# Patient Record
Sex: FEMALE | Race: WHITE | Employment: FULL TIME | URBAN - NONMETROPOLITAN AREA
[De-identification: names, ages, dates, MRNs, and addresses within clinical notes are randomized per-mention and may not be internally consistent; named-entity substitution may affect disease eponyms.]

---

## 2018-01-06 ENCOUNTER — HOSPITAL ENCOUNTER (EMERGENCY)
Age: 33
Discharge: HOME | End: 2018-01-06

## 2018-01-06 VITALS
DIASTOLIC BLOOD PRESSURE: 79 MMHG | WEIGHT: 116.6 LBS | SYSTOLIC BLOOD PRESSURE: 139 MMHG | HEART RATE: 129 BPM | BODY MASS INDEX: 21.33 KG/M2 | RESPIRATION RATE: 16 BRPM | OXYGEN SATURATION: 100 % | TEMPERATURE: 98.7 F

## 2018-01-06 DIAGNOSIS — J40 BRONCHITIS: Primary | ICD-10-CM

## 2018-01-06 PROCEDURE — 99212 OFFICE O/P EST SF 10 MIN: CPT

## 2018-01-06 PROCEDURE — 99213 OFFICE O/P EST LOW 20 MIN: CPT | Performed by: NURSE PRACTITIONER

## 2018-01-06 RX ORDER — PREDNISONE 20 MG/1
60 TABLET ORAL DAILY
Qty: 9 TABLET | Refills: 0 | Status: SHIPPED | OUTPATIENT
Start: 2018-01-06 | End: 2018-01-09

## 2018-01-06 NOTE — ED PROVIDER NOTES
Kenji Gomez [id number]  Urgent Care Encounter      CHIEF COMPLAINT       Chief Complaint   Patient presents with    Cough     productive    Generalized Body Aches    Chills       Nurses Notes reviewed and I agree except as noted in the HPI. HISTORY OF PRESENT ILLNESS   Selina Torres is a 29 y.o. female who presents The history is provided by the patient. URI   Presenting symptoms: congestion, cough, fatigue and sore throat    Cough:     Cough characteristics:  Non-productive, barking, hacking and harsh    Sputum characteristics:  Nondescript    Severity:  Moderate    Onset quality:  Gradual    Duration:  2 weeks    Timing:  Intermittent    Progression:  Waxing and waning    Chronicity:  Recurrent  Severity:  Moderate  Onset quality:  Gradual  Duration:  2 weeks  Timing:  Intermittent  Progression:  Partially resolved  Chronicity:  Recurrent  Relieved by:  None tried  Worsened by:  Drinking, eating and movement  Ineffective treatments:  Decongestant, hot fluids, OTC medications, rest and drinking  Associated symptoms: headaches, myalgias and wheezing    Associated symptoms: no arthralgias, no neck pain, no sinus pain, no sneezing and no swollen glands    Headaches:     Severity:  Moderate    Onset quality:  Gradual    Duration:  2 weeks    Timing:  Intermittent    Progression:  Waxing and waning    Chronicity:  New  Risk factors: recent illness and sick contacts    Risk factors comment:  Smoker    Pt is resistant to history taking by the provider and the staff, provided few details about her illness. Repeated many times that she works 84 hours a week, and she has been sick for 2 weeks. Reports she has been working even though she has been ill. Reports her children have been with their grandparents while she has been working 84 hours a week while she has been sick. Has cursed the staff for trying to get vital signs and provider while the assessment was ongoing.      REVIEW OF SYSTEMS     Review of and oriented to person, place, and time. Skin: Skin is warm and dry. No erythema. No pallor. Psychiatric: Thought content normal. Her mood appears anxious. Her affect is angry. Her speech is not rapid and/or pressured. She is agitated. Nursing note and vitals reviewed. DIAGNOSTIC RESULTS   Labs:No results found for this visit on 01/06/18. IMAGING:  No orders to display     URGENT CARE COURSE:     Vitals:    01/06/18 1804   BP: 139/79   Pulse: 129   Resp: 16   Temp: 98.7 °F (37.1 °C)   TempSrc: Oral   SpO2: 100%   Weight: 116 lb 9.6 oz (52.9 kg)       Medications - No data to display  PROCEDURES:  None  FINAL IMPRESSION      1. Bronchitis      Instructed pt on resolving upper respiratory infection, use of inhaler and steroid pack to decrease any residual inflammation in the chest, smoking cessation, need for increased fluids and rest to prevent dehydration. Attempted to instruct infection control measures in the home due to both children with influenza and strep. Pt is beligerent, cursing at the provider and the staff. Repeatedly asking for antibiotic for herself. Attempted instruction regarding the patient illness and patient is continually talking over the provider and the staff, continues to demand that she be treated for her illness. Explained the difference between bacterial and viral illness, and bronchitis versus pneumonia and pt again disputing the diagnosis. Pt reports she will go across the street to get antibiotics.      DISPOSITION/PLAN   DISPOSITION Decision To Discharge 01/06/2018 06:40:03 PM    PATIENT REFERRED TO:  [address] [facility]  [facility] [id number] [phone]  In 3 days  As needed, If symptoms worsen    DISCHARGE MEDICATIONS:  Discharge Medication List as of 1/6/2018  7:01 PM      START taking these medications    Details   predniSONE (DELTASONE) 20 MG tablet Take 3 tablets by mouth daily for 3 days, Disp-9 tablet, R-0Print albuterol-ipratropium (COMBIVENT RESPIMAT)  MCG/ACT AERS inhaler Inhale 1 puff into the lungs every 6 hours as needed for Wheezing, Disp-1 Inhaler, R-0Print           Discharge Medication List as of 1/6/2018  7:01 PM          FRANCO Zee CNP  01/07/18 0016

## 2018-01-07 ASSESSMENT — ENCOUNTER SYMPTOMS
SWOLLEN GLANDS: 0
COUGH: 1
DIARRHEA: 0
WHEEZING: 1
NAUSEA: 0
CHEST TIGHTNESS: 1
VOMITING: 0
EYE PAIN: 0
EYE ITCHING: 0
SINUS PAIN: 0
SORE THROAT: 1

## 2019-04-30 ENCOUNTER — HOSPITAL ENCOUNTER (EMERGENCY)
Age: 34
Discharge: HOME | End: 2019-05-01

## 2019-04-30 DIAGNOSIS — Q45.3 PANCREATIC DIVISUM: ICD-10-CM

## 2019-04-30 DIAGNOSIS — R19.8 BORBORYGMI: ICD-10-CM

## 2019-04-30 DIAGNOSIS — R10.84 GENERALIZED ABDOMINAL PAIN: Primary | ICD-10-CM

## 2019-04-30 PROCEDURE — 99284 EMERGENCY DEPT VISIT MOD MDM: CPT

## 2019-05-01 ENCOUNTER — APPOINTMENT (OUTPATIENT)
Dept: CT IMAGING | Age: 34
End: 2019-05-01

## 2019-05-01 VITALS
OXYGEN SATURATION: 100 % | SYSTOLIC BLOOD PRESSURE: 111 MMHG | HEART RATE: 79 BPM | BODY MASS INDEX: 22.86 KG/M2 | WEIGHT: 125 LBS | TEMPERATURE: 98 F | DIASTOLIC BLOOD PRESSURE: 74 MMHG | RESPIRATION RATE: 16 BRPM

## 2019-05-01 LAB
ALBUMIN SERPL-MCNC: 4.7 G/DL (ref 3.5–5.1)
ALP BLD-CCNC: 47 U/L (ref 38–126)
ALT SERPL-CCNC: 24 U/L (ref 11–66)
AMPHETAMINE+METHAMPHETAMINE URINE SCREEN: NEGATIVE
ANION GAP SERPL CALCULATED.3IONS-SCNC: 13 MEQ/L (ref 8–16)
AST SERPL-CCNC: 21 U/L (ref 5–40)
BARBITURATE QUANTITATIVE URINE: NEGATIVE
BASOPHILS # BLD: 0.6 %
BASOPHILS ABSOLUTE: 0 THOU/MM3 (ref 0–0.1)
BENZODIAZEPINE QUANTITATIVE URINE: NEGATIVE
BILIRUB SERPL-MCNC: 0.4 MG/DL (ref 0.3–1.2)
BILIRUBIN DIRECT: < 0.2 MG/DL (ref 0–0.3)
BILIRUBIN URINE: NEGATIVE
BLOOD, URINE: NEGATIVE
BUN BLDV-MCNC: 8 MG/DL (ref 7–22)
CALCIUM SERPL-MCNC: 9.3 MG/DL (ref 8.5–10.5)
CANNABINOID QUANTITATIVE URINE: POSITIVE
CHARACTER, URINE: CLEAR
CHLORIDE BLD-SCNC: 104 MEQ/L (ref 98–111)
CO2: 24 MEQ/L (ref 23–33)
COCAINE METABOLITE QUANTITATIVE URINE: NEGATIVE
COLOR: YELLOW
CREAT SERPL-MCNC: 0.5 MG/DL (ref 0.4–1.2)
EOSINOPHIL # BLD: 1.5 %
EOSINOPHILS ABSOLUTE: 0.1 THOU/MM3 (ref 0–0.4)
ERYTHROCYTE [DISTWIDTH] IN BLOOD BY AUTOMATED COUNT: 11.9 % (ref 11.5–14.5)
ERYTHROCYTE [DISTWIDTH] IN BLOOD BY AUTOMATED COUNT: 43.6 FL (ref 35–45)
GFR SERPL CREATININE-BSD FRML MDRD: > 90 ML/MIN/1.73M2
GLUCOSE BLD-MCNC: 93 MG/DL (ref 70–108)
GLUCOSE URINE: NEGATIVE MG/DL
HCT VFR BLD CALC: 40.5 % (ref 37–47)
HEMOGLOBIN: 13.5 GM/DL (ref 12–16)
IMMATURE GRANS (ABS): 0.01 THOU/MM3 (ref 0–0.07)
IMMATURE GRANULOCYTES: 0.1 %
KETONES, URINE: 40
LEUKOCYTE ESTERASE, URINE: NEGATIVE
LIPASE: 13.8 U/L (ref 5.6–51.3)
LYMPHOCYTES # BLD: 41 %
LYMPHOCYTES ABSOLUTE: 3 THOU/MM3 (ref 1–4.8)
MCH RBC QN AUTO: 33.6 PG (ref 26–33)
MCHC RBC AUTO-ENTMCNC: 33.3 GM/DL (ref 32.2–35.5)
MCV RBC AUTO: 100.7 FL (ref 81–99)
MONOCYTES # BLD: 5.5 %
MONOCYTES ABSOLUTE: 0.4 THOU/MM3 (ref 0.4–1.3)
NITRITE, URINE: NEGATIVE
NUCLEATED RED BLOOD CELLS: 0 /100 WBC
OPIATES, URINE: NEGATIVE
OSMOLALITY CALCULATION: 279.3 MOSMOL/KG (ref 275–300)
OXYCODONE: NEGATIVE
PH UA: 7 (ref 5–9)
PHENCYCLIDINE QUANTITATIVE URINE: NEGATIVE
PLATELET # BLD: 334 THOU/MM3 (ref 130–400)
PMV BLD AUTO: 9.6 FL (ref 9.4–12.4)
POTASSIUM SERPL-SCNC: 4.5 MEQ/L (ref 3.5–5.2)
PREGNANCY, SERUM: NEGATIVE
PROTEIN UA: NEGATIVE
RBC # BLD: 4.02 MILL/MM3 (ref 4.2–5.4)
SEG NEUTROPHILS: 51.3 %
SEGMENTED NEUTROPHILS ABSOLUTE COUNT: 3.7 THOU/MM3 (ref 1.8–7.7)
SODIUM BLD-SCNC: 141 MEQ/L (ref 135–145)
SPECIFIC GRAVITY, URINE: 1.01 (ref 1–1.03)
TOTAL PROTEIN: 7.4 G/DL (ref 6.1–8)
UROBILINOGEN, URINE: 0.2 EU/DL (ref 0–1)
WBC # BLD: 7.2 THOU/MM3 (ref 4.8–10.8)

## 2019-05-01 PROCEDURE — 6360000004 HC RX CONTRAST MEDICATION: Performed by: NURSE PRACTITIONER

## 2019-05-01 PROCEDURE — 83690 ASSAY OF LIPASE: CPT

## 2019-05-01 PROCEDURE — 36415 COLL VENOUS BLD VENIPUNCTURE: CPT

## 2019-05-01 PROCEDURE — 85025 COMPLETE CBC W/AUTO DIFF WBC: CPT

## 2019-05-01 PROCEDURE — 84703 CHORIONIC GONADOTROPIN ASSAY: CPT

## 2019-05-01 PROCEDURE — 80307 DRUG TEST PRSMV CHEM ANLYZR: CPT

## 2019-05-01 PROCEDURE — 74177 CT ABD & PELVIS W/CONTRAST: CPT

## 2019-05-01 PROCEDURE — 81003 URINALYSIS AUTO W/O SCOPE: CPT

## 2019-05-01 PROCEDURE — 82248 BILIRUBIN DIRECT: CPT

## 2019-05-01 PROCEDURE — 80053 COMPREHEN METABOLIC PANEL: CPT

## 2019-05-01 RX ORDER — DICYCLOMINE HYDROCHLORIDE 10 MG/1
10 CAPSULE ORAL EVERY 6 HOURS PRN
Qty: 20 CAPSULE | Refills: 0 | Status: SHIPPED | OUTPATIENT
Start: 2019-05-01 | End: 2019-07-28

## 2019-05-01 RX ADMIN — IOPAMIDOL 80 ML: 755 INJECTION, SOLUTION INTRAVENOUS at 02:23

## 2019-05-01 ASSESSMENT — PAIN DESCRIPTION - LOCATION: LOCATION: ABDOMEN

## 2019-05-01 ASSESSMENT — ENCOUNTER SYMPTOMS
VOMITING: 0
ABDOMINAL PAIN: 1
BLOOD IN STOOL: 0
NAUSEA: 0

## 2019-05-01 ASSESSMENT — PAIN SCALES - GENERAL: PAINLEVEL_OUTOF10: 3

## 2019-05-01 NOTE — ED PROVIDER NOTES
[address]  Pt Name: Andreina Owens  MRN: [id number]  [place] 1985  Date of evaluation: 2019  Provider: JACKLYN Moore CNP    CHIEF COMPLAINT       Chief Complaint   Patient presents with    Abdominal Pain       NursesNotes reviewed and I agree except as noted in the HPI. HISTORY OF PRESENT ILLNESS    Andreina Owens is a 30 y.o. female who presents to the emergency department from home for the evaluation of abdominal pain. The patient states that she has been experiencing abdominal pain starting 4 months ago. The patients states that her pain worsened to a 3/10 in severity today. She describes her pain as a straining pain. The patient is concerned that she is experiencing intraabdominal bleeding because she can taste blood in her mouth. The patient denies any hematemesis, or hematochezia. She states that she feels that something is moving around in her abdomen. The patient has a history of C-sections x4 and feels pain to the incision site. She also states that she has a protruding lump from the incision site. The patient reports that she lifts a lot at work. The patient admits lightheadedness and dizziness. The patient denies any nausea, vomiting, or any other signs or symptoms at this time. The patient denies taking any daily medications. The patient admits occasional alcohol consumption but denies any illicit drug use. Her last  was 4 years ago. She has been experiencing GERD recently. Triage notesand Nursing notes were reviewed by myself. Any discrepancies are addressed above. REVIEW OF SYSTEMS     Review of Systems   Gastrointestinal: Positive for abdominal pain. Negative for blood in stool, nausea and vomiting. Neurological: Positive for dizziness and light-headedness. All pertinent systems were reviewed and are negative unless indicated in the HPI.     PAST MEDICAL HISTORY    has a past medical history of Complication of anesthesia temperature is 98 °F (36.7 °C). Her blood pressure is 111/74 and her pulse is 79. Her respiration is 16 and oxygen saturation is 100%. Physical Exam   Constitutional: She is oriented to person, place, and time. She appears well-developed and well-nourished. Non-toxic appearance. HENT:   Head: Normocephalic and atraumatic. Right Ear: Tympanic membrane and external ear normal.   Left Ear: Tympanic membrane and external ear normal.   Nose: Nose normal.   Mouth/Throat: Oropharynx is clear and moist and mucous membranes are normal. No oropharyngeal exudate, posterior oropharyngeal edema or posterior oropharyngeal erythema. Eyes: Conjunctivae and EOM are normal.   Neck: Normal range of motion. Neck supple. No JVD present. Cardiovascular: Normal rate, regular rhythm, normal heart sounds, intact distal pulses and normal pulses. Exam reveals no gallop and no friction rub. No murmur heard. Pulmonary/Chest: Effort normal and breath sounds normal. No respiratory distress. She has no decreased breath sounds. She has no wheezes. She has no rhonchi. She has no rales. Abdominal: Soft. Bowel sounds are normal. She exhibits no distension. There is no tenderness. There is no rebound, no guarding and no CVA tenderness. The patient has a healing superficial abscess to the suprapubic area without erythema, induration, or warmth   Musculoskeletal: Normal range of motion. She exhibits no edema. Neurological: She is alert and oriented to person, place, and time. She exhibits normal muscle tone. Coordination normal.   Skin: Skin is warm and dry. No rash noted. She is not diaphoretic. Nursing note and vitals reviewed.     DIFFERENTIAL DIAGNOSIS:   Including but not limited to gastroenteritis, gastritis, UTI, pyelonephritis, hernia, gastroparesis    DIAGNOSTIC RESULTS     EKG: All EKG's are interpreted by the Emergency Department Physician who either signs or Co-signs this chart in theabsence of a ordered appropriate labs and a CT of the abdomen and pelvis. The patient tested positive for cannabis. The CT showed no acute inflammatory or infectious process in the abdomen or pelvis. No evidence of bowel obstruction. Findings consistent with pancreatic divisum. IUD in the uterus in satisfactory position. Laboratory and imaging results were reviewed and discussed with the patient. The patient responded well to treatment, and I noted her condition to remain stable. I decided that the patient could be discharged home with instructions to follow up with the [address] [place] [address] as written below. I wrote her a prescription for toradol which will be taken as directed. I explained my proposed course of discharge to the patient, and she verbalized understanding and agreement with my proposed plan. All her questions were addressed at bedside. The patient will return to the emergency department for any new or worsening symptoms. Strict return precautions and follow up instructions were discussed with the patient with which the patient agrees     Physical assessment findings, diagnostic testing(s) if applicable, and vital signs reviewed with patient/patient representative. Questions answered. Medications as directed, including OTC medications for supportivecare. Education provided on medications. Differential diagnosis(s) discussed with patient/patient representative. Home care/self care instructions reviewed with patient/patient representative. Patient is tofollow-up with family care provider in 2-3 days if no improvement. Patient is to go to the emergency department if symptoms worsen. Patient/patient representative is aware of care plan, questions answered, verbalizesunderstanding and is in agreement.      ED Medications administered this visit:    Medications   iopamidol (ISOVUE-370) 76 % injection 80 mL (80 mLs Intravenous Given 5/1/19 0223)           I have given the patient strict written and verbal instructions about care at home, follow-up, and signsand symptoms of worsening of condition and they did verbalize understanding. Patient was seen independently by myself. The Patient's finalimpression and disposition and plan was determined by myself. CRITICAL CARE:   None    CONSULTS:  None    PROCEDURES:  None    FINAL IMPRESSION      1. Generalized abdominal pain    2. Pancreatic divisum    3. Borborygmi          DISPOSITION/PLAN   DISPOSITION Decision To Discharge 05/01/2019 03:19:24 AM    PATIENT REFERRED TO:  DR. Manuel [address]  [facility] [place]    Schedule an appointment as soon as possible for a visit in 2 days  For follow up      DISCHARGE MEDICATIONS:  Discharge Medication List as of 5/1/2019  3:20 AM      START taking these medications    Details   dicyclomine (BENTYL) 10 MG capsule Take 1 capsule by mouth every 6 hours as needed (cramps), Disp-20 capsule, R-0Print             (Please note that portions of this note were completed witha voice recognition program.  Efforts were made to edit the dictations but occasionally words are mis-transcribed.)    JACKLYN Shearer CNP    Scribe:  Ronny Ko 5/1/19 12:41 AM Scribing for and in the presence of Elmo Vo CNP. Signed by: Kermit Toro,05/01/19 7:26 AM    Provider:  I personally performed the services described in the documentation, reviewed and edited the documentation which was dictated to the scribe in mypresence, and it accurately records my words and actions.     Elmo Vo CNP 5/1/19 7:26 AM        JACKLYN Shearer CNP, APRN - CNP  05/01/19 0637

## 2019-07-28 ENCOUNTER — HOSPITAL ENCOUNTER (INPATIENT)
Age: 34
LOS: 4 days | Discharge: HOME | DRG: 751 | End: 2019-08-01
Attending: FAMILY MEDICINE | Admitting: PSYCHIATRY & NEUROLOGY
Payer: MEDICAID

## 2019-07-28 DIAGNOSIS — F32.2 CURRENT SEVERE EPISODE OF MAJOR DEPRESSIVE DISORDER WITHOUT PSYCHOTIC FEATURES, UNSPECIFIED WHETHER RECURRENT (HCC): Primary | ICD-10-CM

## 2019-07-28 PROBLEM — F33.2 MAJOR DEPRESSIVE DISORDER, RECURRENT SEVERE WITHOUT PSYCHOTIC FEATURES (HCC): Status: ACTIVE | Noted: 2019-07-28

## 2019-07-28 LAB
ACETAMINOPHEN LEVEL: < 5 UG/ML (ref 0–20)
ALBUMIN SERPL-MCNC: 4.8 G/DL (ref 3.5–5.1)
ALP BLD-CCNC: 49 U/L (ref 38–126)
ALT SERPL-CCNC: 12 U/L (ref 11–66)
AMPHETAMINE+METHAMPHETAMINE URINE SCREEN: NEGATIVE
ANION GAP SERPL CALCULATED.3IONS-SCNC: 13 MEQ/L (ref 8–16)
AST SERPL-CCNC: 16 U/L (ref 5–40)
BARBITURATE QUANTITATIVE URINE: NEGATIVE
BASOPHILS # BLD: 0.6 %
BASOPHILS ABSOLUTE: 0.1 THOU/MM3 (ref 0–0.1)
BENZODIAZEPINE QUANTITATIVE URINE: NEGATIVE
BILIRUB SERPL-MCNC: 0.4 MG/DL (ref 0.3–1.2)
BILIRUBIN DIRECT: < 0.2 MG/DL (ref 0–0.3)
BILIRUBIN URINE: NEGATIVE
BLOOD, URINE: NEGATIVE
BUN BLDV-MCNC: 5 MG/DL (ref 7–22)
CALCIUM SERPL-MCNC: 9.3 MG/DL (ref 8.5–10.5)
CANNABINOID QUANTITATIVE URINE: POSITIVE
CHARACTER, URINE: CLEAR
CHLORIDE BLD-SCNC: 103 MEQ/L (ref 98–111)
CO2: 25 MEQ/L (ref 23–33)
COCAINE METABOLITE QUANTITATIVE URINE: NEGATIVE
COLOR: YELLOW
CREAT SERPL-MCNC: 0.7 MG/DL (ref 0.4–1.2)
EOSINOPHIL # BLD: 2.9 %
EOSINOPHILS ABSOLUTE: 0.3 THOU/MM3 (ref 0–0.4)
ERYTHROCYTE [DISTWIDTH] IN BLOOD BY AUTOMATED COUNT: 11.7 % (ref 11.5–14.5)
ERYTHROCYTE [DISTWIDTH] IN BLOOD BY AUTOMATED COUNT: 43.8 FL (ref 35–45)
ETHYL ALCOHOL, SERUM: 0.12 %
GFR SERPL CREATININE-BSD FRML MDRD: > 90 ML/MIN/1.73M2
GLUCOSE BLD-MCNC: 89 MG/DL (ref 70–108)
GLUCOSE URINE: NEGATIVE MG/DL
HCT VFR BLD CALC: 42.1 % (ref 37–47)
HEMOGLOBIN: 14.1 GM/DL (ref 12–16)
IMMATURE GRANS (ABS): 0.02 THOU/MM3 (ref 0–0.07)
IMMATURE GRANULOCYTES: 0.2 %
KETONES, URINE: NEGATIVE
LEUKOCYTE ESTERASE, URINE: NEGATIVE
LYMPHOCYTES # BLD: 47.5 %
LYMPHOCYTES ABSOLUTE: 4.2 THOU/MM3 (ref 1–4.8)
MCH RBC QN AUTO: 34.1 PG (ref 26–33)
MCHC RBC AUTO-ENTMCNC: 33.5 GM/DL (ref 32.2–35.5)
MCV RBC AUTO: 101.9 FL (ref 81–99)
MONOCYTES # BLD: 5.2 %
MONOCYTES ABSOLUTE: 0.5 THOU/MM3 (ref 0.4–1.3)
NITRITE, URINE: NEGATIVE
NUCLEATED RED BLOOD CELLS: 0 /100 WBC
OPIATES, URINE: NEGATIVE
OSMOLALITY CALCULATION: 278 MOSMOL/KG (ref 275–300)
OXYCODONE: NEGATIVE
PH UA: 6 (ref 5–9)
PHENCYCLIDINE QUANTITATIVE URINE: NEGATIVE
PLATELET # BLD: 307 THOU/MM3 (ref 130–400)
PMV BLD AUTO: 9.7 FL (ref 9.4–12.4)
POTASSIUM SERPL-SCNC: 3.9 MEQ/L (ref 3.5–5.2)
PREGNANCY, SERUM: NEGATIVE
PROTEIN UA: NEGATIVE
RBC # BLD: 4.13 MILL/MM3 (ref 4.2–5.4)
SALICYLATE, SERUM: < 0.3 MG/DL (ref 2–10)
SEG NEUTROPHILS: 43.6 %
SEGMENTED NEUTROPHILS ABSOLUTE COUNT: 3.9 THOU/MM3 (ref 1.8–7.7)
SODIUM BLD-SCNC: 141 MEQ/L (ref 135–145)
SPECIFIC GRAVITY, URINE: < 1.005 (ref 1–1.03)
TOTAL PROTEIN: 7.2 G/DL (ref 6.1–8)
TSH SERPL DL<=0.05 MIU/L-ACNC: 0.93 UIU/ML (ref 0.4–4.2)
UROBILINOGEN, URINE: 0.2 EU/DL (ref 0–1)
WBC # BLD: 8.9 THOU/MM3 (ref 4.8–10.8)

## 2019-07-28 PROCEDURE — 36415 COLL VENOUS BLD VENIPUNCTURE: CPT

## 2019-07-28 PROCEDURE — 84443 ASSAY THYROID STIM HORMONE: CPT

## 2019-07-28 PROCEDURE — 80048 BASIC METABOLIC PNL TOTAL CA: CPT

## 2019-07-28 PROCEDURE — 85025 COMPLETE CBC W/AUTO DIFF WBC: CPT

## 2019-07-28 PROCEDURE — 80307 DRUG TEST PRSMV CHEM ANLYZR: CPT

## 2019-07-28 PROCEDURE — G0480 DRUG TEST DEF 1-7 CLASSES: HCPCS

## 2019-07-28 PROCEDURE — 81003 URINALYSIS AUTO W/O SCOPE: CPT

## 2019-07-28 PROCEDURE — 80076 HEPATIC FUNCTION PANEL: CPT

## 2019-07-28 PROCEDURE — 99285 EMERGENCY DEPT VISIT HI MDM: CPT

## 2019-07-28 PROCEDURE — 1240000000 HC EMOTIONAL WELLNESS R&B

## 2019-07-28 PROCEDURE — 84703 CHORIONIC GONADOTROPIN ASSAY: CPT

## 2019-07-28 RX ORDER — NICOTINE 21 MG/24HR
1 PATCH, TRANSDERMAL 24 HOURS TRANSDERMAL DAILY
Status: DISCONTINUED | OUTPATIENT
Start: 2019-07-29 | End: 2019-08-01 | Stop reason: HOSPADM

## 2019-07-28 RX ORDER — ACETAMINOPHEN 325 MG/1
650 TABLET ORAL EVERY 4 HOURS PRN
Status: DISCONTINUED | OUTPATIENT
Start: 2019-07-28 | End: 2019-08-01 | Stop reason: HOSPADM

## 2019-07-28 RX ORDER — TRAZODONE HYDROCHLORIDE 50 MG/1
50 TABLET ORAL NIGHTLY PRN
Status: DISCONTINUED | OUTPATIENT
Start: 2019-07-29 | End: 2019-07-31

## 2019-07-28 RX ORDER — MAGNESIUM HYDROXIDE/ALUMINUM HYDROXICE/SIMETHICONE 120; 1200; 1200 MG/30ML; MG/30ML; MG/30ML
30 SUSPENSION ORAL EVERY 6 HOURS PRN
Status: DISCONTINUED | OUTPATIENT
Start: 2019-07-28 | End: 2019-08-01 | Stop reason: HOSPADM

## 2019-07-28 ASSESSMENT — PATIENT HEALTH QUESTIONNAIRE - PHQ9: SUM OF ALL RESPONSES TO PHQ QUESTIONS 1-9: 14

## 2019-07-28 ASSESSMENT — SLEEP AND FATIGUE QUESTIONNAIRES
AVERAGE NUMBER OF SLEEP HOURS: 7
DO YOU HAVE DIFFICULTY SLEEPING: NO
DO YOU USE A SLEEP AID: NO

## 2019-07-29 LAB — ETHYL ALCOHOL, SERUM: 0.05 %

## 2019-07-29 PROCEDURE — 6370000000 HC RX 637 (ALT 250 FOR IP): Performed by: FAMILY MEDICINE

## 2019-07-29 PROCEDURE — 36415 COLL VENOUS BLD VENIPUNCTURE: CPT

## 2019-07-29 PROCEDURE — 90792 PSYCH DIAG EVAL W/MED SRVCS: CPT | Performed by: PSYCHIATRY & NEUROLOGY

## 2019-07-29 PROCEDURE — G0480 DRUG TEST DEF 1-7 CLASSES: HCPCS

## 2019-07-29 PROCEDURE — 6370000000 HC RX 637 (ALT 250 FOR IP): Performed by: PSYCHIATRY & NEUROLOGY

## 2019-07-29 PROCEDURE — 1240000000 HC EMOTIONAL WELLNESS R&B

## 2019-07-29 RX ORDER — THIAMINE MONONITRATE (VIT B1) 100 MG
100 TABLET ORAL ONCE
Status: COMPLETED | OUTPATIENT
Start: 2019-07-29 | End: 2019-07-29

## 2019-07-29 RX ORDER — FOLIC ACID 1 MG/1
1 TABLET ORAL ONCE
Status: COMPLETED | OUTPATIENT
Start: 2019-07-29 | End: 2019-07-29

## 2019-07-29 RX ADMIN — FOLIC ACID 1 MG: 1 TABLET ORAL at 08:57

## 2019-07-29 RX ADMIN — Medication 100 MG: at 08:56

## 2019-07-29 RX ADMIN — TRAZODONE HYDROCHLORIDE 50 MG: 50 TABLET ORAL at 21:13

## 2019-07-29 ASSESSMENT — ENCOUNTER SYMPTOMS
NAUSEA: 0
COUGH: 0
SORE THROAT: 0
WHEEZING: 0
ABDOMINAL PAIN: 0
BACK PAIN: 0
DIARRHEA: 0
SHORTNESS OF BREATH: 0
VOMITING: 0
EYE PAIN: 0
RHINORRHEA: 0
EYE DISCHARGE: 0

## 2019-07-29 ASSESSMENT — LIFESTYLE VARIABLES: HISTORY_ALCOHOL_USE: YES

## 2019-07-29 ASSESSMENT — PATIENT HEALTH QUESTIONNAIRE - PHQ9: SUM OF ALL RESPONSES TO PHQ QUESTIONS 1-9: 13

## 2019-07-29 ASSESSMENT — SLEEP AND FATIGUE QUESTIONNAIRES
DO YOU USE A SLEEP AID: NO
AVERAGE NUMBER OF SLEEP HOURS: 7
DO YOU HAVE DIFFICULTY SLEEPING: NO

## 2019-07-29 ASSESSMENT — PAIN SCALES - GENERAL
PAINLEVEL_OUTOF10: 0

## 2019-07-29 NOTE — PROGRESS NOTES
establish firm housing. Patient admits to not being able to adequately provide for her children. Reports concern that she will not be able to get them to school or get them school supplies, uniforms, etc. HAROON discussed family shelters and also educated patient on respite placement with children services so patient can get on her feet and obtain stable employment and housing while children are in a safe place, however patient is determined to keep her children out of children services. Patient would like to be connected with family shelter, will provide patient with these resources. Patient reports occasional alcohol use and daily marijuana use. Legal issues denied at this time. HAROON will continue to discharge plan.      SHAWN Sullivan

## 2019-07-29 NOTE — PLAN OF CARE
Problem: Suicide risk  Goal: Provide patient with safe environment  Description  Provide patient with safe environment  7/29/2019 1632 by Estephanie Turcios LPN  Outcome: Ongoing  Note:   Patient provided with safe environment throughout the shift and during admission. 7/29/2019 1611 by Estephanie Turcios LPN  Outcome: Ongoing  7/29/2019 1453 by Estephanie Turcios LPN  Outcome: Ongoing  Note:   Patient denies suicidal ideations during shift assessment. Problem: Discharge Planning:  Goal: Discharged to appropriate level of care  Description  Discharged to appropriate level of care  7/29/2019 1632 by Estephanie Turcios LPN  Outcome: Ongoing  Note:   No discharge plans noted at this time during shift.  7/29/2019 1611 by Estephanie Turcios LPN  Outcome: Ongoing  7/29/2019 1453 by Estephanie Turcios LPN  Outcome: Ongoing  Note:   No discharge plans noted at this time during shift. Problem: Depressive Behavior With or Without Suicide Precautions:  Goal: Able to verbalize acceptance of life and situations over which he or she has no control  Description  Able to verbalize acceptance of life and situations over which he or she has no control  7/29/2019 1632 by Estephanie Turcios LPN  Outcome: Ongoing  Note:   Patient continues to work on verbalization of acceptance of life and situations over which she has no control. 7/29/2019 1611 by Estephanie Turcios LPN  Outcome: Ongoing  7/29/2019 1453 by Estephanie Turcios LPN  Outcome: Ongoing  Note:   Patient continues to work on verbalization of acceptance of life and situations over which she has no control. Goal: Able to verbalize and/or display a decrease in depressive symptoms  Description  Able to verbalize and/or display a decrease in depressive symptoms  7/29/2019 1632 by Estephanie Turcios LPN  Outcome: Ongoing  Note:   Patient reports depressive symptoms during shift assessment.  Patient rates depression 6/10.  7/29/2019 1611 by Estephanie Turcios planning  7/29/2019 1632 by Ahmet Mclean LPN  Outcome: Ongoing  Note:   Patient participates in care planning with staff.  7/29/2019 1611 by Ahmet Mclean LPN  Outcome: Ongoing  7/29/2019 1453 by Ahmet Mclean LPN  Outcome: Ongoing  Note:   Patient participates in care planning during shift with staff. Problem: Anxiety:  Goal: Level of anxiety will decrease  Description  Level of anxiety will decrease  7/29/2019 1632 by Ahmet Mclean LPN  Outcome: Ongoing  Note:   Patient denies anxiety during shift assessment. 7/29/2019 1611 by Ahmet Mclean LPN  Outcome: Ongoing  7/29/2019 1453 by Ahmet Mclean LPN  Outcome: Ongoing  Note:   Patient denies anxiety during shift assessment. Problem: KNOWLEDGE DEFICIT  Goal: Knowledge - personal safety  7/29/2019 1632 by Ahmet Mclean LPN  Outcome: Ongoing  Note:   Patient did not complete safety plan at this time during shift.  7/29/2019 1611 by Ahmet Mclean LPN  Outcome: Ongoing  7/29/2019 1453 by Ahmet Mclean LPN  Outcome: Ongoing  Note:   Patient did not complete safety plan at this time during shift. Care plan reviewed with patient.   Patient does verbalize understanding of the plan of care and does contribute to goal setting
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
## 2019-07-29 NOTE — H&P
Department of Psychiatry  Attending Physician Psychiatric Assessment     Reason for Admission to Psychiatric Unit:  Threat to self requiring 24 hour professional observation  Concerns about patient's safety in the community    CHIEF COMPLAINT:  Suicidal ideation with a plan to jump off of a bridge    History obtained from:  patient, electronic medical record and family members    HISTORY OF PRESENT ILLNESS:    Vanita Pitt is a 30 y.o. female with significant past medical history of depression who presented to the ED in by police after she was found climbing a bridge with an intent to jump off that and kill herself. Patient reports feeling down and low for more days than not. Endorses anhedonia. Patient notes her depression has been worsening for last few weeks . Patient identifies stressors as poor support, unable to provide a safe environment for her children, unemployed, housing issues. Patient reports poor sleep and appetite. Patient reports having trouble falling asleep. Patient reports poor concentration and energy. Patient endorses feelings of helplessness, hopelessness and worthlessness. She denies any psychotic symptoms. she denies any auditory or visual hallucinations.        PSYCHIATRIC HISTORY:  yes - depression  Currently follows with PCP  One lifetime suicide attempts  one psychiatric hospital admission - 10 years ago to [facility]    Past psychiatric medications includes: Some SSRI, could not recollect the name    Adverse reactions from psychotropic medications: yes - weight gain    Lifetime Psychiatric Review of Systems         Danae or Hypomania: denies      Panic Attacks: denies      Phobias: denies     Obsessions and Compulsions:denies     Body or Vocal Tics:  denies     Hallucinations:denies     Delusions: denies    Past Medical History:        Diagnosis Date    Complication of anesthesia     headache after last spinal    Postpartum depression     history of depression       Past Surgical

## 2019-07-29 NOTE — PATIENT CARE CONFERENCE
[facility]  Initial Interdisciplinary Treatment Plan NOTE    Review Date & Time: 7/29/19 1515    Patient was in treatment team    Admission Type:   Admission Type: Involuntary    Reason for admission:  Reason for Admission: Major depressive disorder      Estimated Length of Stay Update:  3-5 days  Estimated Discharge Date Update: 3-5 days    PATIENT STRENGTHS:  Patient Strengths Strengths: Communication, Motivated  Patient Strengths and Limitations:Limitations: Difficult relationships / poor social skills, Difficulty problem solving/relies on others to help solve problems, Multiple barriers to leisure interests  Addictive Behavior:Addictive Behavior  In the past 3 months, have you felt or has someone told you that you have a problem with:  : None  Do you have a history of Chemical Use?: No  Do you have a history of Alcohol Use?: Yes  Do you have a history of Street Drug Abuse?: Yes  Histroy of Prescripton Drug Abuse?: No  Medical Problems:  Past Medical History:   Diagnosis Date    Complication of anesthesia     headache after last spinal    Postpartum depression     history of depression       EDUCATION:   Learner Progress Toward Treatment Goals: Reviewed results and recommendations of this team, Reviewed group plan and strategies, Reviewed signs, symptoms and risk of self harm and violent behavior and Reviewed goals and plan of care    Method: Small group    Outcome: Verbalized understanding and Demonstrated Understanding    PATIENT GOALS: Improve depression, outpatient provider, housing    PLAN/TREATMENT RECOMMENDATIONS UPDATE:   1. What is the most important thing we can help you with while you are here?  - Improve depression, outpatient provider, housing  2. Who is your support system? - Limited  3. Do you have follow-up providers? - None  4. Do you have the ability to pay for your medications? - No  5. Where will you be residing when you leave the hospital?  - Unknown  6.  What is a phone

## 2019-07-30 PROCEDURE — 6370000000 HC RX 637 (ALT 250 FOR IP): Performed by: PSYCHIATRY & NEUROLOGY

## 2019-07-30 PROCEDURE — 99232 SBSQ HOSP IP/OBS MODERATE 35: CPT | Performed by: PSYCHIATRY & NEUROLOGY

## 2019-07-30 PROCEDURE — 90833 PSYTX W PT W E/M 30 MIN: CPT | Performed by: PSYCHIATRY & NEUROLOGY

## 2019-07-30 PROCEDURE — 1240000000 HC EMOTIONAL WELLNESS R&B

## 2019-07-30 RX ORDER — SERTRALINE HYDROCHLORIDE 100 MG/1
100 TABLET, FILM COATED ORAL DAILY
Status: DISCONTINUED | OUTPATIENT
Start: 2019-07-31 | End: 2019-08-01 | Stop reason: HOSPADM

## 2019-07-30 RX ADMIN — TRAZODONE HYDROCHLORIDE 50 MG: 50 TABLET ORAL at 21:16

## 2019-07-30 RX ADMIN — SERTRALINE 50 MG: 50 TABLET, FILM COATED ORAL at 10:33

## 2019-07-30 ASSESSMENT — PAIN SCALES - GENERAL
PAINLEVEL_OUTOF10: 0
PAINLEVEL_OUTOF10: 0

## 2019-07-30 NOTE — PROGRESS NOTES
Daily Progress Note  Stevenson Varela MD  7/30/2019  CHIEF COMPLAINT:  Suicidal ideation    Reviewed patient's current plan of care and vital signs with nursing staff. Sleep:  6 hours last night  Attending groups: Yes    SUBJECTIVE:    Patient laying in bed, withdrawn. She reports her mood is up and down. Patient is irritable off and on. Reports concentration is poor. Patient feels helpless ,hopless and worthless. . Denies any hallucinations or delusions. Sleep is fluctuating, appetite is poor. Patient feels down depressed nervous and anxious. has marked anticipatory anxiety, that was explored during the session, support was given and clarification done. Medication reviewed. No side effects from medication reported.  Side-effect of medication were discussed with the patient. Case was discussed with the  and with the staff. Mental Status Exam  Level of consciousness:  Within normal limits  Appearance: Hospital attire, seated in chair, with good grooming and hygiene   Behavior/Motor: No abnormalities noted  Attitude toward examiner:  Cooperative, attentive, good eye contact  Speech:  spontaneous, normal rate, normal volume and well articulated  Mood:  depressed  Affect: flat  Thought processes:  linear, goal directed and coherent  Thought content:  denies homicidal ideation  Suicidal Ideation: passive suicidal ideation  Delusions:  no evidence of delusions  Perceptual Disturbance:  denies any perceptual disturbance  Cognition:  Oriented to self, location, time, and situation  Memory: age appropriate  Insight & Judgement: improving  Medication side effects:  denies       Data   height is 5' 2\" (1.575 m) and weight is 135 lb (61.2 kg). Her tympanic temperature is 97.8 °F (36.6 °C). Her blood pressure is 94/50 (abnormal) and her pulse is 83. Her respiration is 16 and oxygen saturation is 100%.    Labs:   Admission on 07/28/2019   Component Date Value Ref Range Status    WBC 07/28/2019 8.9  4.8 - 40 U/L Final    ALT 07/28/2019 12  11 - 66 U/L Final    Total Protein 07/28/2019 7.2  6.1 - 8.0 g/dL Final    Performed at [address] [place] [address], [address]    TSH 07/28/2019 0.925  0.400 - 4.20 uIU/mL Final    Performed at [address] [place] [address], [facility]    Glucose, Ur 07/28/2019 NEGATIVE  NEGATIVE mg/dl Final    Bilirubin Urine 07/28/2019 NEGATIVE  NEGATIVE Final    Ketones, Urine 07/28/2019 NEGATIVE  NEGATIVE Final    Specific Gravity, Urine 07/28/2019 < 1.005  1.002 - 1.03 Final    Blood, Urine 07/28/2019 NEGATIVE  NEGATIVE Final    pH, UA 07/28/2019 6.0  5.0 - 9.0 Final    Protein, UA 07/28/2019 NEGATIVE  NEGATIVE Final    Urobilinogen, Urine 07/28/2019 0.2  0.0 - 1.0 eu/dl Final    Nitrite, Urine 07/28/2019 NEGATIVE  NEGATIVE Final    Leukocyte Esterase, Urine 07/28/2019 NEGATIVE  NEGATIVE Final    Color, UA 07/28/2019 YELLOW  STRAW-YELL Final    Character, Urine 07/28/2019 CLEAR  CLEAR-SL C Final    Performed at [address] [place] [address], [address]    AMPHETAMINE+METHAMPHETAMINE URINE * 07/28/2019 Negative  NEGATIVE Final    Barbiturate Quant, Ur 07/28/2019 Negative  NEGATIVE Final    Benzodiazepine Quant, Ur 07/28/2019 Negative  NEGATIVE Final    Cannabinoid Quant, Ur 07/28/2019 POSITIVE  NEGATIVE Final    Cocaine Metab Quant, Ur 07/28/2019 Negative  NEGATIVE Final    Opiates, Urine 07/28/2019 Negative  NEGATIVE Final    Oxycodone 07/28/2019 Negative  NEGATIVE Final    PCP Quant, Ur 07/28/2019 Negative  NEGATIVE Final    Comment: A \"Negative\" result for a drug abuse screen test indicates     that the drug concentration is below the following cutoffs:            Amphetamine/Methamphetamine     1000 ng/ml            Barbiturate                      200 ng/ml            Benzodiazapine                   200 ng/ml            Cannabinoids                      50 ng/ml            Cocaine Metabolite               300 ng/ml Foundation. Calculation based  upon serum creatinine and adjusted for age, gender & race. Светлана. Internal Med., Vol. 139 (2) pg 137-147. Performed at [address] [place] [address], [address], SERUM 07/29/2019 0.05  0.00 % Final    Performed at [address] [place] [address], [address]            Medications  Current Facility-Administered Medications: sertraline (ZOLOFT) tablet 50 mg, 50 mg, Oral, Daily  acetaminophen (TYLENOL) tablet 650 mg, 650 mg, Oral, Q4H PRN  traZODone (DESYREL) tablet 50 mg, 50 mg, Oral, Nightly PRN  magnesium hydroxide (MILK OF MAGNESIA) 400 MG/5ML suspension 30 mL, 30 mL, Oral, Daily PRN  aluminum & magnesium hydroxide-simethicone (MAALOX) 200-200-20 MG/5ML suspension 30 mL, 30 mL, Oral, Q6H PRN  nicotine (NICODERM CQ) 21 MG/24HR 1 patch, 1 patch, Transdermal, Daily    ASSESSMENT  Major depressive disorder, recurrent severe without psychotic features [facility])     PLAN  Patient s symptoms   are improving  Will increase Zoloft to help with mood  Attempt to develop insight  Psycho-education conducted. Supportive Therapy conducted. Probable discharge is TBD  Follow-up [place] services    More than 16 minutes of the session was spent doing supportive psychotherapy. Session started at 9:00am and ended at 9:30am.    Electronically signed by Naga Cunningham MD on 7/30/19 at 5:00 PM    **This report has been created using voice recognition software. It may contain minor errors which are inherent in voice recognition technology. **

## 2019-07-30 NOTE — GROUP NOTE
Group Therapy Note    Date: July 30    Group Start Time: 1050  Group End Time: 1125  Group Topic: Psychoeducation    [facility] 4E    SHAWN Samson            Notes:  Patient present in group. Patient active in group discussion regarding anxiety producing thoughts. Patient is able to discuss situations that cause significant anxiety for her. Able to develop rational counter thoughts to challenge these negative thinking patterns. Patient able to provide/recieve peer support appropriately. Status After Intervention:  Improved    Participation Level:  Active Listener and Interactive    Participation Quality: Appropriate, Attentive, Sharing and Supportive      Speech:  normal      Thought Process/Content: Logical      Affective Functioning: Congruent      Mood: euthymic      Level of consciousness:  Alert, Oriented x4 and Attentive      Response to Learning: Progressing to goal      Endings: None Reported    Modes of Intervention: Education, Support, Socialization, Exploration, Clarifying and Problem-solving      Discipline Responsible: /Counselor      Signature:  SHAWN Samson

## 2019-07-31 PROCEDURE — 99231 SBSQ HOSP IP/OBS SF/LOW 25: CPT | Performed by: PSYCHIATRY & NEUROLOGY

## 2019-07-31 PROCEDURE — 1240000000 HC EMOTIONAL WELLNESS R&B

## 2019-07-31 PROCEDURE — 6370000000 HC RX 637 (ALT 250 FOR IP): Performed by: PSYCHIATRY & NEUROLOGY

## 2019-07-31 PROCEDURE — 90833 PSYTX W PT W E/M 30 MIN: CPT | Performed by: PSYCHIATRY & NEUROLOGY

## 2019-07-31 RX ADMIN — SERTRALINE 100 MG: 100 TABLET, FILM COATED ORAL at 08:57

## 2019-07-31 ASSESSMENT — PAIN SCALES - GENERAL
PAINLEVEL_OUTOF10: 0
PAINLEVEL_OUTOF10: 0

## 2019-07-31 NOTE — PLAN OF CARE
Patient has attended all of the groups today and has also been out of her room for social interaction with others so she has met her socialization goal for this shift.

## 2019-07-31 NOTE — PLAN OF CARE
Problem: Suicide risk  Goal: Provide patient with safe environment  Description  Provide patient with safe environment  7/30/2019 2353 by Curt Contreras RN  Outcome: Completed  7/30/2019 1214 by Kelby Gillis RN  Outcome: Met This Shift  Note:   Maintained in safe and secure environment. Patient did not fill out safety plan this shift. Problem: Discharge Planning:  Goal: Discharged to appropriate level of care  Description  Discharged to appropriate level of care  7/30/2019 2353 by Curt Contreras RN  Outcome: Ongoing  Note:   Patient states she is currently checking for placement options for her and her children. 7/30/2019 1214 by Kelby Gillis RN  Outcome: Not Met This Shift  Note:   Discharge planners working with patient to achieve optimal discharge plan, specific to the needs of this patient. Problem: Depressive Behavior With or Without Suicide Precautions:  Goal: Able to verbalize acceptance of life and situations over which he or she has no control  Description  Able to verbalize acceptance of life and situations over which he or she has no control  7/30/2019 2353 by Curt Contreras RN  Outcome: Ongoing  Note:   Patient verbalizes fair acceptance of situations over which she has no control. 7/30/2019 1214 by Kelby Gillis RN  Outcome: Ongoing  Note:   Patient verbalizes some acceptance of situations over which she has no control. Encouraged patient to attend group therapy. Goal: Able to verbalize and/or display a decrease in depressive symptoms  Description  Able to verbalize and/or display a decrease in depressive symptoms  7/30/2019 2353 by Curt Contreras RN  Outcome: Ongoing  Note:   Patient states she continues to feel somewhat depressed this shift.   7/30/2019 1214 by Kelby Gillis RN  Outcome: Ongoing  Note:   Pt is still depressed and feels like everyone is judging her, pt started new medication and is hoping is helps with her depression  Goal: Ability to disclose and states she continues to feel somewhat anxious this shift. 7/30/2019 1214 by Cathy Kilpatrick RN  Outcome: Ongoing  Note:   Patient reports anxiety. Pt taking scheduled medication      Problem: KNOWLEDGE DEFICIT  Goal: Knowledge - personal safety  7/30/2019 2353 by Fanny Jackson RN  Outcome: Ongoing  Note:   Patient maintained in a safe environment. 15 minute visual checks continued. Patient verbalizes fair understanding of safety precautions. 7/30/2019 1214 by Cathy Kilpatrick RN  Outcome: Ongoing  Note:   Maintained in safe and secure environment. Patient did not fill out safety plan this shift. Problem: Social interaction  Goal: Increased social interaction  7/30/2019 1136 by Anjelica Poor  Outcome: Met This Shift   Care plan reviewed with patient.   Patient does verbalize understanding of the plan of care and does contribute to goal setting

## 2019-07-31 NOTE — PROGRESS NOTES
07/28/2019 14.1  12.0 - 16.0 gm/dl Final    Hematocrit 07/28/2019 42.1  37.0 - 47.0 % Final    MCV 07/28/2019 101.9* 81.0 - 99.0 fL Final    MCH 07/28/2019 34.1* 26.0 - 33.0 pg Final    MCHC 07/28/2019 33.5  32.2 - 35.5 gm/dl Final    RDW-CV 07/28/2019 11.7  11.5 - 14.5 % Final    RDW-SD 07/28/2019 43.8  35.0 - 45.0 fL Final    Platelets 29/92/9554 307  130 - 400 thou/mm3 Final    MPV 07/28/2019 9.7  9.4 - 12.4 fL Final    Seg Neutrophils 07/28/2019 43.6  % Final    Lymphocytes 07/28/2019 47.5  % Final    Monocytes 07/28/2019 5.2  % Final    Eosinophils 07/28/2019 2.9  % Final    Basophils 07/28/2019 0.6  % Final    Immature Granulocytes 07/28/2019 0.2  % Final    Segs Absolute 07/28/2019 3.9  1.8 - 7.7 thou/mm3 Final    Lymphocytes # 07/28/2019 4.2  1.0 - 4.8 thou/mm3 Final    Monocytes # 07/28/2019 0.5  0.4 - 1.3 thou/mm3 Final    Eosinophils # 07/28/2019 0.3  0.0 - 0.4 thou/mm3 Final    Basophils # 07/28/2019 0.1  0.0 - 0.1 thou/mm3 Final    Immature Grans (Abs) 07/28/2019 0.02  0.00 - 0.07 thou/mm3 Final    nRBC 07/28/2019 0  /100 wbc Final    Performed at [facility], [address]    Sodium 07/28/2019 141  135 - 145 meq/L Final    Potassium 07/28/2019 3.9  3.5 - 5.2 meq/L Final    Chloride 07/28/2019 103  98 - 111 meq/L Final    CO2 07/28/2019 25  23 - 33 meq/L Final    Glucose 07/28/2019 89  70 - 108 mg/dL Final    BUN 07/28/2019 5* 7 - 22 mg/dL Final    CREATININE 07/28/2019 0.7  0.4 - 1.2 mg/dL Final    Calcium 07/28/2019 9.3  8.5 - 10.5 mg/dL Final    Performed at [address] [place] [address], [address]    Alb 07/28/2019 4.8  3.5 - 5.1 g/dL Final    Total Bilirubin 07/28/2019 0.4  0.3 - 1.2 mg/dL Final    Bilirubin, Direct 07/28/2019 <0.2  0.0 - 0.3 mg/dL Final    Alkaline Phosphatase 07/28/2019 49  38 - 126 U/L Final    AST 07/28/2019 16  5 - 40 U/L Final    ALT 07/28/2019 12  11 - 66 U/L Final    Total Protein 07/28/2019 7.2

## 2019-07-31 NOTE — PLAN OF CARE
anxiety will decrease  Outcome: Ongoing  Note:   Patient denies anxiety during shift assessment. Problem: KNOWLEDGE DEFICIT  Goal: Knowledge - personal safety  Outcome: Ongoing  Note:   Patient did not complete safety plan at this time during shift. Problem: Social interaction  Goal: Increased social interaction  7/31/2019 1355 by Zoe Olivares  Outcome: Met This Shift     Care plan reviewed with patient.   Patient does verbalize understanding of the plan of care and does contribute to goal setting

## 2019-08-01 VITALS
HEART RATE: 85 BPM | OXYGEN SATURATION: 97 % | HEIGHT: 62 IN | WEIGHT: 135 LBS | RESPIRATION RATE: 16 BRPM | DIASTOLIC BLOOD PRESSURE: 67 MMHG | TEMPERATURE: 96.9 F | SYSTOLIC BLOOD PRESSURE: 111 MMHG | BODY MASS INDEX: 24.84 KG/M2

## 2019-08-01 PROCEDURE — 6370000000 HC RX 637 (ALT 250 FOR IP): Performed by: PSYCHIATRY & NEUROLOGY

## 2019-08-01 PROCEDURE — 99239 HOSP IP/OBS DSCHRG MGMT >30: CPT | Performed by: PSYCHIATRY & NEUROLOGY

## 2019-08-01 PROCEDURE — 5130000000 HC BRIDGE APPOINTMENT

## 2019-08-01 RX ORDER — SERTRALINE HYDROCHLORIDE 100 MG/1
100 TABLET, FILM COATED ORAL DAILY
Qty: 30 TABLET | Refills: 0 | Status: SHIPPED | OUTPATIENT
Start: 2019-08-02 | End: 2020-06-16

## 2019-08-01 RX ADMIN — SERTRALINE 100 MG: 100 TABLET, FILM COATED ORAL at 08:26

## 2019-08-01 NOTE — BH NOTE
24 hour chart review completed.
24 hour chart review completed.
Group Therapy Note    Date: 8/1/2019  Start Time:  1000  End Time:   7288  Number of Participants:  7    Type of Group: Recreational/Coping Skills    Patient's Goal:    Increase knowledge of positive coping skills and socialization. Notes:    Patient was social with others. Patient was able to identify multiple positive coping skills.      Status After Intervention:  Improved    Participation Level: Interactive    Participation Quality: Appropriate, Attentive and Sharing      Speech:  normal      Thought Process/Content: Logical      Affective Functioning: Congruent      Mood: euthymic      Level of consciousness:  Oriented x4      Response to Learning: Able to verbalize current knowledge/experience, Capable of insight and Progressing to goal      Endings: None Reported    Modes of Intervention: Education, Support, Socialization, Exploration and Activity      Discipline Responsible: Psychoeducational Specialist      Signature:  Adina Dumont
PLAN OF CARE:     Start Time: 0900  End Time:  0930    Group Topic:  Daily Goals    Group Type:   Goal Group    Intervention/Goal:  To increase support and identify daily goals    Attendance:   Attended group     Affect:    flat    Behavior:   Cooperative and pleasant    Response:   appropriate    Daily Goal:    Get out of here, get resources for my family and be with my daughter today.      Progress:   Progressing to goal
Patient declined to attend evening group.
This R.N. has reviewed and agrees with Rosio Gentile LPN's shift   Assessment.     Lisandro Dwyer  7/31/2019
blunt  APPEARANCE:  appropriate    HEARING:   No problems  VISION:   No problems   VERBAL COMMUNICATION:   No problems  WRITTEN COMMUNICATION:   No problems    COORDINATION:  No problems noted   MOBILITY:   Ambulates independently    GOALS:   Increase socialization by attending groups on the unit daily.

## 2019-08-02 ENCOUNTER — TELEPHONE (OUTPATIENT)
Dept: PSYCHIATRY | Age: 34
End: 2019-08-02

## 2020-06-16 ENCOUNTER — APPOINTMENT (OUTPATIENT)
Dept: GENERAL RADIOLOGY | Age: 35
End: 2020-06-16
Payer: MEDICARE

## 2020-06-16 ENCOUNTER — HOSPITAL ENCOUNTER (EMERGENCY)
Age: 35
Discharge: HOME | End: 2020-06-16
Payer: MEDICARE

## 2020-06-16 VITALS
BODY MASS INDEX: 27.6 KG/M2 | HEART RATE: 92 BPM | SYSTOLIC BLOOD PRESSURE: 138 MMHG | TEMPERATURE: 98.3 F | RESPIRATION RATE: 14 BRPM | WEIGHT: 150 LBS | OXYGEN SATURATION: 100 % | HEIGHT: 62 IN | DIASTOLIC BLOOD PRESSURE: 81 MMHG

## 2020-06-16 PROCEDURE — 99283 EMERGENCY DEPT VISIT LOW MDM: CPT

## 2020-06-16 PROCEDURE — 72100 X-RAY EXAM L-S SPINE 2/3 VWS: CPT

## 2020-06-16 RX ORDER — ACETAMINOPHEN 500 MG
1000 TABLET ORAL EVERY 6 HOURS PRN
COMMUNITY

## 2020-06-16 RX ORDER — CYCLOBENZAPRINE HCL 10 MG
10 TABLET ORAL 3 TIMES DAILY PRN
Qty: 20 TABLET | Refills: 0 | Status: SHIPPED | OUTPATIENT
Start: 2020-06-16

## 2020-06-16 ASSESSMENT — ENCOUNTER SYMPTOMS
ABDOMINAL PAIN: 0
VOMITING: 0
CONSTIPATION: 0
NAUSEA: 0
SHORTNESS OF BREATH: 0
BACK PAIN: 1
DIARRHEA: 0

## 2020-06-16 ASSESSMENT — PAIN DESCRIPTION - DESCRIPTORS: DESCRIPTORS: SHARP

## 2020-06-16 ASSESSMENT — PAIN DESCRIPTION - ORIENTATION: ORIENTATION: LOWER

## 2020-06-16 ASSESSMENT — PAIN DESCRIPTION - PAIN TYPE: TYPE: ACUTE PAIN

## 2020-06-16 ASSESSMENT — PAIN DESCRIPTION - LOCATION: LOCATION: BACK

## 2020-06-16 ASSESSMENT — PAIN DESCRIPTION - FREQUENCY: FREQUENCY: CONTINUOUS

## 2020-06-16 ASSESSMENT — PAIN SCALES - GENERAL: PAINLEVEL_OUTOF10: 7

## 2020-06-16 NOTE — ED PROVIDER NOTES
M[facility] COMPLAINT       Chief Complaint   Patient presents with    Back Pain     lower       Nurses Notes reviewed and I agree except as noted in the HPI. HISTORY OF PRESENT ILLNESS    Yesika Fu is a 37 y.o. female who presents to the Emergency Department for the evaluation of lower back pain. Patient reports onset of lower back pain 2 weeks ago and describes an associated shooting pain down the left anterior lateral thigh. Patient denies any recent known injury. Patient states that she left work today and came to the department for evaluation due to significant worsening of pain. Patient describes worsening of pain with movement. Patient denies any difficulties with her bowel or bladder. Patient denies any abnormal vaginal bleeding or discharge. Patient reports no other pertinent symptoms at the time of the initial encounter. The HPI was provided by the patient. REVIEW OF SYSTEMS     Review of Systems   Constitutional: Negative for fever. Respiratory: Negative for shortness of breath. Cardiovascular: Negative for chest pain. Gastrointestinal: Negative for abdominal pain, constipation, diarrhea, nausea and vomiting. Genitourinary: Negative for difficulty urinating, vaginal bleeding and vaginal discharge. Musculoskeletal: Positive for back pain (lower back; radiates down the lateral left anterior leg). Negative for arthralgias. PAST MEDICAL HISTORY    has a past medical history of Complication of anesthesia and Postpartum depression. SURGICAL HISTORY      has a past surgical history that includes  section (2006, 2012); Wrist surgery (age 10); Tonsillectomy; and Arm Surgery.     CURRENT MEDICATIONS       Discharge Medication List as of 2020 12:24 PM      CONTINUE these medications which have NOT CHANGED    Details   acetaminophen (TYLENOL) 500 MG tablet Take 1,000 mg by mouth every 6 hours as None    CONSULTS:  None    PROCEDURES:  None    FINAL IMPRESSION      1. Strain of lumbar region, initial encounter          DISPOSITION/PLAN   Discharge    PATIENT REFERRED TO:  [address] [address],[address] [facility]. [address]  In 1 week        DISCHARGE MEDICATIONS:  Discharge Medication List as of 6/16/2020 12:24 PM      START taking these medications    Details   etodolac (LODINE) 300 MG capsule Take 1 capsule by mouth every 8 hours, Disp-30 capsule, R-0Print      cyclobenzaprine (FLEXERIL) 10 MG tablet Take 1 tablet by mouth 3 times daily as needed for Muscle spasms, Disp-20 tablet, R-0Print             (Please note that portions of this note were completed with a voice recognition program.  Efforts were made to edit the dictations but occasionally words are mis-transcribed.)    The patient was given an opportunity to see the Emergency Attending. The patient voiced understanding that I was a Mid-LevelProvider and was in agreement with being seen independently by myself. Scribe:  Sahara Martínez, 6/16/20, 11:42 AM EDT Scribing for and in the presence of [facility]. Signed by: Kermit Thomson, 06/16/20 3:24 PM    Provider:  I personally performed the services described in the documentation, reviewed and edited the documentation which was dictated to the scribe in my presence, and it accurately records my words and actions.     RAMIRO Bocanegra, 6/16/20, 3:24 PM        RAMIRO Bocanegra  06/16/20 7008

## 2021-01-05 ENCOUNTER — HOSPITAL ENCOUNTER (OUTPATIENT)
Age: 36
Discharge: HOME | End: 2021-01-05
Payer: MEDICARE

## 2021-01-05 LAB
ABO: NORMAL
ANTIBODY SCREEN: NORMAL
ERYTHROCYTE [DISTWIDTH] IN BLOOD BY AUTOMATED COUNT: 12 % (ref 11.5–14.5)
ERYTHROCYTE [DISTWIDTH] IN BLOOD BY AUTOMATED COUNT: 45.1 FL (ref 35–45)
HCT VFR BLD CALC: 37.4 % (ref 37–47)
HEMOGLOBIN: 12.5 GM/DL (ref 12–16)
HEPATITIS B SURFACE ANTIGEN: NEGATIVE
MCH RBC QN AUTO: 33.8 PG (ref 26–33)
MCHC RBC AUTO-ENTMCNC: 33.4 GM/DL (ref 32.2–35.5)
MCV RBC AUTO: 101.1 FL (ref 81–99)
PLATELET # BLD: 292 THOU/MM3 (ref 130–400)
PMV BLD AUTO: 10.3 FL (ref 9.4–12.4)
RBC # BLD: 3.7 MILL/MM3 (ref 4.2–5.4)
RH FACTOR: NORMAL
RUBELLA: 29 IU/ML
WBC # BLD: 12 THOU/MM3 (ref 4.8–10.8)

## 2021-01-05 PROCEDURE — 86762 RUBELLA ANTIBODY: CPT

## 2021-01-05 PROCEDURE — 86901 BLOOD TYPING SEROLOGIC RH(D): CPT

## 2021-01-05 PROCEDURE — 87086 URINE CULTURE/COLONY COUNT: CPT

## 2021-01-05 PROCEDURE — 85027 COMPLETE CBC AUTOMATED: CPT

## 2021-01-05 PROCEDURE — 86900 BLOOD TYPING SEROLOGIC ABO: CPT

## 2021-01-05 PROCEDURE — 87340 HEPATITIS B SURFACE AG IA: CPT

## 2021-01-05 PROCEDURE — 36415 COLL VENOUS BLD VENIPUNCTURE: CPT

## 2021-01-05 PROCEDURE — 86850 RBC ANTIBODY SCREEN: CPT

## 2021-01-05 PROCEDURE — 87389 HIV-1 AG W/HIV-1&-2 AB AG IA: CPT

## 2021-01-05 PROCEDURE — 86592 SYPHILIS TEST NON-TREP QUAL: CPT

## 2021-01-06 LAB
ORGANISM: ABNORMAL
RPR: NONREACTIVE
URINE CULTURE, ROUTINE: ABNORMAL

## 2021-01-08 LAB — HIV-2 AB: NEGATIVE

## 2021-01-09 LAB
AFP MOM: 0.77
AFP: 27 NG/ML
DATING: NORMAL
DUE DATE: NORMAL
FAMILY HISTORY NTD: NO
GESTATIONAL AGE CALC AT COLLECT: NORMAL
INSULIN REQ DIABETES: NO
MATERNAL AGE AT EDD: 34.2 YR
MATERNAL SCREEN INTERPRETATION: NORMAL
MATERNAL WEIGHT: NORMAL
NUMBER OF FETUSES: NORMAL
RACE: NORMAL
SMOKING: YES
SPECIMEN: NORMAL

## 2021-06-14 ENCOUNTER — NURSE ONLY (OUTPATIENT)
Dept: LAB | Age: 36
End: 2021-06-14

## 2021-06-14 LAB
ABO: NORMAL
ANTIBODY SCREEN: NORMAL
BASOPHILS # BLD: 0.4 %
BASOPHILS ABSOLUTE: 0.1 THOU/MM3 (ref 0–0.1)
EOSINOPHIL # BLD: 1.4 %
EOSINOPHILS ABSOLUTE: 0.2 THOU/MM3 (ref 0–0.4)
ERYTHROCYTE [DISTWIDTH] IN BLOOD BY AUTOMATED COUNT: 12.8 % (ref 11.5–14.5)
ERYTHROCYTE [DISTWIDTH] IN BLOOD BY AUTOMATED COUNT: 47.5 FL (ref 35–45)
HCT VFR BLD CALC: 39.2 % (ref 37–47)
HEMOGLOBIN: 12.5 GM/DL (ref 12–16)
IMMATURE GRANS (ABS): 0.1 THOU/MM3 (ref 0–0.07)
IMMATURE GRANULOCYTES: 0.7 %
LYMPHOCYTES # BLD: 24.8 %
LYMPHOCYTES ABSOLUTE: 3.6 THOU/MM3 (ref 1–4.8)
MCH RBC QN AUTO: 32.1 PG (ref 26–33)
MCHC RBC AUTO-ENTMCNC: 31.9 GM/DL (ref 32.2–35.5)
MCV RBC AUTO: 100.5 FL (ref 81–99)
MONOCYTES # BLD: 7 %
MONOCYTES ABSOLUTE: 1 THOU/MM3 (ref 0.4–1.3)
NUCLEATED RED BLOOD CELLS: 0 /100 WBC
PLATELET # BLD: 296 THOU/MM3 (ref 130–400)
PMV BLD AUTO: 12 FL (ref 9.4–12.4)
RBC # BLD: 3.9 MILL/MM3 (ref 4.2–5.4)
RH FACTOR: NORMAL
RPR: NONREACTIVE
SEG NEUTROPHILS: 65.7 %
SEGMENTED NEUTROPHILS ABSOLUTE COUNT: 9.6 THOU/MM3 (ref 1.8–7.7)
WBC # BLD: 14.6 THOU/MM3 (ref 4.8–10.8)

## 2021-06-15 ENCOUNTER — ANESTHESIA (OUTPATIENT)
Dept: LABOR AND DELIVERY | Age: 36
DRG: 539 | End: 2021-06-15
Payer: MEDICARE

## 2021-06-15 ENCOUNTER — HOSPITAL ENCOUNTER (INPATIENT)
Age: 36
LOS: 3 days | Discharge: HOME | DRG: 539 | End: 2021-06-18
Attending: OBSTETRICS & GYNECOLOGY | Admitting: OBSTETRICS & GYNECOLOGY
Payer: MEDICARE

## 2021-06-15 ENCOUNTER — ANESTHESIA EVENT (OUTPATIENT)
Dept: LABOR AND DELIVERY | Age: 36
DRG: 539 | End: 2021-06-15
Payer: MEDICARE

## 2021-06-15 VITALS — OXYGEN SATURATION: 100 % | SYSTOLIC BLOOD PRESSURE: 110 MMHG | DIASTOLIC BLOOD PRESSURE: 60 MMHG | TEMPERATURE: 96.8 F

## 2021-06-15 DIAGNOSIS — Z98.891 S/P C-SECTION: Primary | ICD-10-CM

## 2021-06-15 LAB
AMPHETAMINE+METHAMPHETAMINE URINE SCREEN: NEGATIVE
BARBITURATE QUANTITATIVE URINE: NEGATIVE
BENZODIAZEPINE QUANTITATIVE URINE: NEGATIVE
CANNABINOID QUANTITATIVE URINE: POSITIVE
COCAINE METABOLITE QUANTITATIVE URINE: NEGATIVE
OPIATES, URINE: NEGATIVE
OXYCODONE: NEGATIVE
PHENCYCLIDINE QUANTITATIVE URINE: NEGATIVE

## 2021-06-15 PROCEDURE — 7100000000 HC PACU RECOVERY - FIRST 15 MIN: Performed by: OBSTETRICS & GYNECOLOGY

## 2021-06-15 PROCEDURE — 6370000000 HC RX 637 (ALT 250 FOR IP): Performed by: OBSTETRICS & GYNECOLOGY

## 2021-06-15 PROCEDURE — 2500000003 HC RX 250 WO HCPCS: Performed by: OBSTETRICS & GYNECOLOGY

## 2021-06-15 PROCEDURE — 7100000001 HC PACU RECOVERY - ADDTL 15 MIN: Performed by: OBSTETRICS & GYNECOLOGY

## 2021-06-15 PROCEDURE — 2500000003 HC RX 250 WO HCPCS

## 2021-06-15 PROCEDURE — 1220000000 HC SEMI PRIVATE OB R&B

## 2021-06-15 PROCEDURE — 6360000002 HC RX W HCPCS: Performed by: OBSTETRICS & GYNECOLOGY

## 2021-06-15 PROCEDURE — 6360000002 HC RX W HCPCS

## 2021-06-15 PROCEDURE — 6360000002 HC RX W HCPCS: Performed by: ANESTHESIOLOGY

## 2021-06-15 PROCEDURE — 2709999900 HC NON-CHARGEABLE SUPPLY: Performed by: OBSTETRICS & GYNECOLOGY

## 2021-06-15 PROCEDURE — 3700000000 HC ANESTHESIA ATTENDED CARE: Performed by: OBSTETRICS & GYNECOLOGY

## 2021-06-15 PROCEDURE — 2580000003 HC RX 258: Performed by: OBSTETRICS & GYNECOLOGY

## 2021-06-15 PROCEDURE — 3700000001 HC ADD 15 MINUTES (ANESTHESIA): Performed by: OBSTETRICS & GYNECOLOGY

## 2021-06-15 PROCEDURE — 3609079900 HC CESAREAN SECTION: Performed by: OBSTETRICS & GYNECOLOGY

## 2021-06-15 PROCEDURE — 0UT70ZZ RESECTION OF BILATERAL FALLOPIAN TUBES, OPEN APPROACH: ICD-10-PCS | Performed by: OBSTETRICS & GYNECOLOGY

## 2021-06-15 PROCEDURE — 88302 TISSUE EXAM BY PATHOLOGIST: CPT

## 2021-06-15 PROCEDURE — 6360000002 HC RX W HCPCS: Performed by: NURSE ANESTHETIST, CERTIFIED REGISTERED

## 2021-06-15 PROCEDURE — 80307 DRUG TEST PRSMV CHEM ANLYZR: CPT

## 2021-06-15 PROCEDURE — 2720000010 HC SURG SUPPLY STERILE: Performed by: OBSTETRICS & GYNECOLOGY

## 2021-06-15 PROCEDURE — 2500000003 HC RX 250 WO HCPCS: Performed by: NURSE ANESTHETIST, CERTIFIED REGISTERED

## 2021-06-15 RX ORDER — TRISODIUM CITRATE DIHYDRATE AND CITRIC ACID MONOHYDRATE 500; 334 MG/5ML; MG/5ML
15 SOLUTION ORAL ONCE
Status: COMPLETED | OUTPATIENT
Start: 2021-06-15 | End: 2021-06-15

## 2021-06-15 RX ORDER — ONDANSETRON 2 MG/ML
4 INJECTION INTRAMUSCULAR; INTRAVENOUS EVERY 6 HOURS PRN
Status: ACTIVE | OUTPATIENT
Start: 2021-06-15 | End: 2021-06-16

## 2021-06-15 RX ORDER — ACETAMINOPHEN 325 MG/1
975 TABLET ORAL ONCE
Status: COMPLETED | OUTPATIENT
Start: 2021-06-15 | End: 2021-06-15

## 2021-06-15 RX ORDER — KETOROLAC TROMETHAMINE 30 MG/ML
30 INJECTION, SOLUTION INTRAMUSCULAR; INTRAVENOUS EVERY 6 HOURS
Status: DISCONTINUED | OUTPATIENT
Start: 2021-06-15 | End: 2021-06-15 | Stop reason: SDUPTHER

## 2021-06-15 RX ORDER — MORPHINE SULFATE 4 MG/ML
4 INJECTION, SOLUTION INTRAMUSCULAR; INTRAVENOUS
Status: DISCONTINUED | OUTPATIENT
Start: 2021-06-16 | End: 2021-06-18 | Stop reason: HOSPADM

## 2021-06-15 RX ORDER — MODIFIED LANOLIN
OINTMENT (GRAM) TOPICAL
Status: DISCONTINUED | OUTPATIENT
Start: 2021-06-15 | End: 2021-06-18 | Stop reason: HOSPADM

## 2021-06-15 RX ORDER — OXYCODONE HYDROCHLORIDE 5 MG/1
5 TABLET ORAL EVERY 4 HOURS PRN
Status: DISCONTINUED | OUTPATIENT
Start: 2021-06-16 | End: 2021-06-18 | Stop reason: HOSPADM

## 2021-06-15 RX ORDER — ACETAMINOPHEN 500 MG
1000 TABLET ORAL EVERY 8 HOURS SCHEDULED
Status: DISCONTINUED | OUTPATIENT
Start: 2021-06-15 | End: 2021-06-18 | Stop reason: HOSPADM

## 2021-06-15 RX ORDER — METOCLOPRAMIDE HYDROCHLORIDE 5 MG/ML
10 INJECTION INTRAMUSCULAR; INTRAVENOUS ONCE
Status: COMPLETED | OUTPATIENT
Start: 2021-06-15 | End: 2021-06-15

## 2021-06-15 RX ORDER — SODIUM CHLORIDE 9 MG/ML
25 INJECTION, SOLUTION INTRAVENOUS PRN
Status: DISCONTINUED | OUTPATIENT
Start: 2021-06-15 | End: 2021-06-18 | Stop reason: HOSPADM

## 2021-06-15 RX ORDER — KETOROLAC TROMETHAMINE 30 MG/ML
30 INJECTION, SOLUTION INTRAMUSCULAR; INTRAVENOUS EVERY 6 HOURS
Status: DISCONTINUED | OUTPATIENT
Start: 2021-06-16 | End: 2021-06-16

## 2021-06-15 RX ORDER — OXYCODONE HYDROCHLORIDE 5 MG/1
10 TABLET ORAL EVERY 4 HOURS PRN
Status: DISCONTINUED | OUTPATIENT
Start: 2021-06-16 | End: 2021-06-18 | Stop reason: HOSPADM

## 2021-06-15 RX ORDER — OXYCODONE HYDROCHLORIDE AND ACETAMINOPHEN 5; 325 MG/1; MG/1
2 TABLET ORAL EVERY 4 HOURS PRN
Status: ACTIVE | OUTPATIENT
Start: 2021-06-15 | End: 2021-06-16

## 2021-06-15 RX ORDER — SODIUM CHLORIDE, SODIUM LACTATE, POTASSIUM CHLORIDE, CALCIUM CHLORIDE 600; 310; 30; 20 MG/100ML; MG/100ML; MG/100ML; MG/100ML
INJECTION, SOLUTION INTRAVENOUS CONTINUOUS
Status: DISCONTINUED | OUTPATIENT
Start: 2021-06-15 | End: 2021-06-18 | Stop reason: HOSPADM

## 2021-06-15 RX ORDER — BUPIVACAINE HYDROCHLORIDE 7.5 MG/ML
INJECTION, SOLUTION INTRASPINAL PRN
Status: DISCONTINUED | OUTPATIENT
Start: 2021-06-15 | End: 2021-06-15 | Stop reason: SDUPTHER

## 2021-06-15 RX ORDER — NALOXONE HYDROCHLORIDE 0.4 MG/ML
0.4 INJECTION, SOLUTION INTRAMUSCULAR; INTRAVENOUS; SUBCUTANEOUS PRN
Status: ACTIVE | OUTPATIENT
Start: 2021-06-15 | End: 2021-06-16

## 2021-06-15 RX ORDER — FERROUS SULFATE 325(65) MG
325 TABLET ORAL
Status: DISCONTINUED | OUTPATIENT
Start: 2021-06-16 | End: 2021-06-18 | Stop reason: HOSPADM

## 2021-06-15 RX ORDER — MORPHINE SULFATE 2 MG/ML
2 INJECTION, SOLUTION INTRAMUSCULAR; INTRAVENOUS
Status: DISCONTINUED | OUTPATIENT
Start: 2021-06-16 | End: 2021-06-18 | Stop reason: HOSPADM

## 2021-06-15 RX ORDER — KETOROLAC TROMETHAMINE 30 MG/ML
30 INJECTION, SOLUTION INTRAMUSCULAR; INTRAVENOUS EVERY 6 HOURS
Status: DISCONTINUED | OUTPATIENT
Start: 2021-06-15 | End: 2021-06-16

## 2021-06-15 RX ORDER — OXYTOCIN 10 [USP'U]/ML
INJECTION, SOLUTION INTRAMUSCULAR; INTRAVENOUS PRN
Status: DISCONTINUED | OUTPATIENT
Start: 2021-06-15 | End: 2021-06-15 | Stop reason: SDUPTHER

## 2021-06-15 RX ORDER — SIMETHICONE 80 MG
80 TABLET,CHEWABLE ORAL EVERY 6 HOURS PRN
Status: DISCONTINUED | OUTPATIENT
Start: 2021-06-15 | End: 2021-06-18 | Stop reason: HOSPADM

## 2021-06-15 RX ORDER — ONDANSETRON 2 MG/ML
4 INJECTION INTRAMUSCULAR; INTRAVENOUS EVERY 6 HOURS PRN
Status: DISCONTINUED | OUTPATIENT
Start: 2021-06-15 | End: 2021-06-15 | Stop reason: HOSPADM

## 2021-06-15 RX ORDER — KETOROLAC TROMETHAMINE 30 MG/ML
INJECTION, SOLUTION INTRAMUSCULAR; INTRAVENOUS PRN
Status: DISCONTINUED | OUTPATIENT
Start: 2021-06-15 | End: 2021-06-15 | Stop reason: SDUPTHER

## 2021-06-15 RX ORDER — ONDANSETRON 2 MG/ML
INJECTION INTRAMUSCULAR; INTRAVENOUS PRN
Status: DISCONTINUED | OUTPATIENT
Start: 2021-06-15 | End: 2021-06-15 | Stop reason: SDUPTHER

## 2021-06-15 RX ORDER — SODIUM CHLORIDE, SODIUM LACTATE, POTASSIUM CHLORIDE, AND CALCIUM CHLORIDE .6; .31; .03; .02 G/100ML; G/100ML; G/100ML; G/100ML
1000 INJECTION, SOLUTION INTRAVENOUS ONCE
Status: DISCONTINUED | OUTPATIENT
Start: 2021-06-15 | End: 2021-06-18 | Stop reason: HOSPADM

## 2021-06-15 RX ORDER — ONDANSETRON 2 MG/ML
4 INJECTION INTRAMUSCULAR; INTRAVENOUS EVERY 6 HOURS PRN
Status: DISCONTINUED | OUTPATIENT
Start: 2021-06-16 | End: 2021-06-18 | Stop reason: HOSPADM

## 2021-06-15 RX ORDER — IBUPROFEN 800 MG/1
800 TABLET ORAL EVERY 8 HOURS
Status: DISCONTINUED | OUTPATIENT
Start: 2021-06-17 | End: 2021-06-16

## 2021-06-15 RX ORDER — CARBOPROST TROMETHAMINE 250 UG/ML
250 INJECTION, SOLUTION INTRAMUSCULAR PRN
Status: DISCONTINUED | OUTPATIENT
Start: 2021-06-15 | End: 2021-06-18 | Stop reason: HOSPADM

## 2021-06-15 RX ORDER — DOCUSATE SODIUM 100 MG/1
100 CAPSULE, LIQUID FILLED ORAL 2 TIMES DAILY
COMMUNITY

## 2021-06-15 RX ORDER — MISOPROSTOL 200 UG/1
800 TABLET ORAL PRN
Status: DISCONTINUED | OUTPATIENT
Start: 2021-06-15 | End: 2021-06-18 | Stop reason: HOSPADM

## 2021-06-15 RX ORDER — SODIUM CHLORIDE 0.9 % (FLUSH) 0.9 %
10 SYRINGE (ML) INJECTION PRN
Status: DISCONTINUED | OUTPATIENT
Start: 2021-06-15 | End: 2021-06-18 | Stop reason: HOSPADM

## 2021-06-15 RX ORDER — MORPHINE SULFATE 0.5 MG/ML
INJECTION, SOLUTION EPIDURAL; INTRATHECAL; INTRAVENOUS PRN
Status: DISCONTINUED | OUTPATIENT
Start: 2021-06-15 | End: 2021-06-15 | Stop reason: SDUPTHER

## 2021-06-15 RX ORDER — ONDANSETRON 4 MG/1
8 TABLET, ORALLY DISINTEGRATING ORAL EVERY 8 HOURS PRN
Status: DISCONTINUED | OUTPATIENT
Start: 2021-06-16 | End: 2021-06-18 | Stop reason: HOSPADM

## 2021-06-15 RX ORDER — DIPHENHYDRAMINE HYDROCHLORIDE 50 MG/ML
25 INJECTION INTRAMUSCULAR; INTRAVENOUS EVERY 6 HOURS PRN
Status: DISCONTINUED | OUTPATIENT
Start: 2021-06-16 | End: 2021-06-18 | Stop reason: HOSPADM

## 2021-06-15 RX ORDER — DIPHENHYDRAMINE HYDROCHLORIDE 50 MG/ML
25 INJECTION INTRAMUSCULAR; INTRAVENOUS EVERY 6 HOURS PRN
Status: ACTIVE | OUTPATIENT
Start: 2021-06-15 | End: 2021-06-16

## 2021-06-15 RX ORDER — SODIUM CHLORIDE 0.9 % (FLUSH) 0.9 %
10 SYRINGE (ML) INJECTION EVERY 12 HOURS SCHEDULED
Status: DISCONTINUED | OUTPATIENT
Start: 2021-06-15 | End: 2021-06-18 | Stop reason: HOSPADM

## 2021-06-15 RX ORDER — PHENYLEPHRINE HCL IN 0.9% NACL 1 MG/10 ML
SYRINGE (ML) INTRAVENOUS PRN
Status: DISCONTINUED | OUTPATIENT
Start: 2021-06-15 | End: 2021-06-15 | Stop reason: SDUPTHER

## 2021-06-15 RX ORDER — BISACODYL 10 MG
10 SUPPOSITORY, RECTAL RECTAL DAILY PRN
Status: DISCONTINUED | OUTPATIENT
Start: 2021-06-15 | End: 2021-06-18 | Stop reason: HOSPADM

## 2021-06-15 RX ORDER — PRENATAL WITH FERROUS FUM AND FOLIC ACID 3080; 920; 120; 400; 22; 1.84; 3; 20; 10; 1; 12; 200; 27; 25; 2 [IU]/1; [IU]/1; MG/1; [IU]/1; MG/1; MG/1; MG/1; MG/1; MG/1; MG/1; UG/1; MG/1; MG/1; MG/1; MG/1
1 TABLET ORAL DAILY
Status: DISCONTINUED | OUTPATIENT
Start: 2021-06-15 | End: 2021-06-18 | Stop reason: HOSPADM

## 2021-06-15 RX ORDER — DOCUSATE SODIUM 100 MG/1
100 CAPSULE, LIQUID FILLED ORAL 2 TIMES DAILY
Status: DISCONTINUED | OUTPATIENT
Start: 2021-06-15 | End: 2021-06-18 | Stop reason: HOSPADM

## 2021-06-15 RX ORDER — METHYLERGONOVINE MALEATE 0.2 MG/ML
200 INJECTION INTRAVENOUS PRN
Status: DISCONTINUED | OUTPATIENT
Start: 2021-06-15 | End: 2021-06-18 | Stop reason: HOSPADM

## 2021-06-15 RX ADMIN — Medication 87.3 MILLI-UNITS/MIN: at 09:08

## 2021-06-15 RX ADMIN — SODIUM CHLORIDE, POTASSIUM CHLORIDE, SODIUM LACTATE AND CALCIUM CHLORIDE: 600; 310; 30; 20 INJECTION, SOLUTION INTRAVENOUS at 06:47

## 2021-06-15 RX ADMIN — CEFAZOLIN 2000 MG: 10 INJECTION, POWDER, FOR SOLUTION INTRAVENOUS at 07:32

## 2021-06-15 RX ADMIN — ONDANSETRON HYDROCHLORIDE 4 MG: 4 INJECTION, SOLUTION INTRAMUSCULAR; INTRAVENOUS at 07:41

## 2021-06-15 RX ADMIN — KETOROLAC TROMETHAMINE 30 MG: 30 INJECTION, SOLUTION INTRAMUSCULAR at 08:49

## 2021-06-15 RX ADMIN — SODIUM CHLORIDE, POTASSIUM CHLORIDE, SODIUM LACTATE AND CALCIUM CHLORIDE: 600; 310; 30; 20 INJECTION, SOLUTION INTRAVENOUS at 18:18

## 2021-06-15 RX ADMIN — KETOROLAC TROMETHAMINE 30 MG: 30 INJECTION, SOLUTION INTRAMUSCULAR; INTRAVENOUS at 22:39

## 2021-06-15 RX ADMIN — FAMOTIDINE 20 MG: 10 INJECTION, SOLUTION INTRAVENOUS at 06:44

## 2021-06-15 RX ADMIN — SODIUM CHLORIDE, POTASSIUM CHLORIDE, SODIUM LACTATE AND CALCIUM CHLORIDE: 600; 310; 30; 20 INJECTION, SOLUTION INTRAVENOUS at 10:12

## 2021-06-15 RX ADMIN — DOCUSATE SODIUM 100 MG: 100 CAPSULE, LIQUID FILLED ORAL at 20:03

## 2021-06-15 RX ADMIN — Medication 100 MCG: at 08:01

## 2021-06-15 RX ADMIN — OXYTOCIN 2 ML: 10 INJECTION, SOLUTION INTRAMUSCULAR; INTRAVENOUS at 08:09

## 2021-06-15 RX ADMIN — SODIUM CITRATE AND CITRIC ACID MONOHYDRATE 15 ML: 500; 334 SOLUTION ORAL at 06:43

## 2021-06-15 RX ADMIN — BUPIVACAINE HYDROCHLORIDE 1.6 ML: 7.5 INJECTION, SOLUTION SUBARACHNOID at 07:46

## 2021-06-15 RX ADMIN — METOCLOPRAMIDE 10 MG: 5 INJECTION, SOLUTION INTRAMUSCULAR; INTRAVENOUS at 06:43

## 2021-06-15 RX ADMIN — MORPHINE SULFATE 0.2 MG: 0.5 INJECTION, SOLUTION EPIDURAL; INTRATHECAL; INTRAVENOUS at 07:46

## 2021-06-15 RX ADMIN — KETOROLAC TROMETHAMINE 30 MG: 30 INJECTION, SOLUTION INTRAMUSCULAR; INTRAVENOUS at 16:44

## 2021-06-15 RX ADMIN — SODIUM CHLORIDE, POTASSIUM CHLORIDE, SODIUM LACTATE AND CALCIUM CHLORIDE: 600; 310; 30; 20 INJECTION, SOLUTION INTRAVENOUS at 05:32

## 2021-06-15 RX ADMIN — ACETAMINOPHEN 975 MG: 325 TABLET ORAL at 06:44

## 2021-06-15 ASSESSMENT — PULMONARY FUNCTION TESTS
PIF_VALUE: 0

## 2021-06-15 ASSESSMENT — PAIN SCALES - GENERAL
PAINLEVEL_OUTOF10: 2
PAINLEVEL_OUTOF10: 1
PAINLEVEL_OUTOF10: 0

## 2021-06-15 NOTE — FLOWSHEET NOTE
Patient arrived on foot with fob for scheduled repeat . Patient has no c/o ctx, no leakage of fluid, no bleeding and is reporting positive fetal movement. Patient oriented to room, gown provided, call light within reach. Patient to bathroom to void, informed of maternal drug testing policy in place on all laboring patients. Verbal consent received, paper consent to be signed and urine to be sent. Explained patients right to have family, representative or physician notified of their admission. Patient has Declined for physician to be notified. Patient has Declined for family/representative to be notified.

## 2021-06-15 NOTE — H&P
[address] [address]  History and Physical Update    Pt Name: Renny Kyle  MRN: [id number]  YOB: 1985  Date of evaluation: 6/15/2021    [x] I have examined the patient and reviewed the H&P/Consult and there are no changes to the patient or plans. Pt continues to desire csection and risk reductive bilateral salpingectomy   [] I have examined the patient and reviewed the H&P/Consult and have noted the following changes:       Discussion with the patient and/ or family for proposed care, treatment, services; benefits, risks, side effects; likelihood of achieving goals and potential problems that may occur during recuperation was had and all questions were answered. Discussion with the patient and/ or family of reasonable alternatives to the proposed care, treatment, services and the discussion of the risks, benefits, side effects related to the alternatives and the risk related to not receiving the proposed care treatment services was also had and all questions were answered. If this is for an elective surgical procedure then The patient was counseled at length about the risks of yann Covid-19 during their perioperative period and any recovery window from their procedure. The patient was made aware that yann Covid-19  may worsen their prognosis for recovering from their procedure  and lend to a higher morbidity and/or mortality risk. All material risks, benefits, and reasonable alternatives including postponing the procedure were discussed. The patient  does wish to proceed with the procedure at this time.              Xavier Wolf MD,MD  Electronically signed 6/15/2021 at 9:09 AM

## 2021-06-15 NOTE — PLAN OF CARE
Iraida Casillas RN  Note: Will be transferred to mother/baby when medically stable, discharged home when cleared by physician. 6/15/2021 0505 by Xiang Tinoco RN  Outcome: Ongoing  Note: Discussed infant care and breast feeding. 6/15/2021 0505 by Xiang Tinoco RN  Outcome: Ongoing    Care plan reviewed with patient and family. Patient and family verbalize understanding of the plan of care and contribute to goal setting.

## 2021-06-15 NOTE — PLAN OF CARE
Problem: Anxiety:  Goal: Level of anxiety will decrease  Description: Level of anxiety will decrease  6/15/2021 0505 by Joycelyn Lopez RN  Outcome: Ongoing  Note: Will alleviate anxiety by answering all questions. 6/15/2021 0505 by Joycelyn Lopez RN  Outcome: Ongoing     Problem: Aspiration - Risk of:  Goal: Absence of aspiration  Description: Absence of aspiration  6/15/2021 0505 by Joycelyn Lopez RN  Outcome: Ongoing  Note: Patient NPO since midnight. 6/15/2021 0505 by Joycelyn Lopez RN  Outcome: Ongoing     Problem: Tissue Perfusion - Uteroplacental, Altered:  Goal: Absence of abnormal fetal heart rate pattern  Description: Absence of abnormal fetal heart rate pattern  6/15/2021 0505 by Joycelyn Lopez RN  Outcome: Ongoing  Note: Will remain wdnl. 6/15/2021 0505 by Joycelyn Lopez RN  Outcome: Ongoing     Problem: Venous Thromboembolism - Risk of:  Goal: Will show no signs or symptoms of venous thromboembolism  Description: Will show no signs or symptoms of venous thromboembolism  6/15/2021 0505 by Joycelyn Lopez RN  Outcome: Ongoing  Note: SCD's on and functioning. 6/15/2021 0505 by Joycelyn Lopez RN  Outcome: Ongoing     Problem: Discharge Planning:  Goal: Discharged to appropriate level of care  Description: Discharged to appropriate level of care  6/15/2021 0505 by Joycelyn Lopez RN  Outcome: Ongoing  Note: Discussed infant care and breast feeding. 6/15/2021 0505 by Joycelyn Lopez RN  Outcome: Ongoing  Care plan reviewed with patient and fob. Patient and fob verbalize understanding of the plan of care and contribute to goal setting.

## 2021-06-15 NOTE — L&D DELIVERY SUMMARY NOTE
Department of Obstetrics and Gynecology   Section Note    Pt Name: Augustine Middleton  MRN: [id number] [place] #: [de-identified]  YOB: 1985  Procedure Performed By: Nash Nava MD, MD    Indications: previous uterine incision     Pre-operative Diagnosis: 39 week pregnancy. Post-operative Diagnosis:  Same, Delivered, Living newbornMale  Procedure:  repeat low transverse  section rr bilateral salpingectomy  Findings:  Normal tubes, ovaries and uterus. Baby Male, Apgars  8,9 and weight of 6 lbs and 9 ounces. Estimated Blood Loss:  500ml         Specimens:  Fallopian Tubes    Complications:  None         Condition: infant stable to general nursery and mother stable    Indications:     Augustine Middleton is a [address] [place] [address] Street y.o. female [id number] at 44w2d who presented for   section and rr salpingectomy for  previous uterine incision . She understood the  risks and benefits and signed informed consent. Procedure: The patient was taken to the Operating Room where spinal anesthesia was placed. She was placed in the dorsal supine position with a leftward tilt and prepped and draped. A Pfannenstiel incision was made with the scalpel taken down to the fascia. The fascia was nicked in the midline. This incision extended laterally. The underlying rectus muscle dissected bluntly and with the Tucker scissors. The peritoneum identified and entered sharply. This incision extended superiorly and inferior with good visualization of the bladder. The vesicouterine peritoneum was identified and entered sharply. This incision extended laterally and the bladder flap created digitally. The uterus was incised in a low transverse fashion and extended digitally. The vertex was removed from the uterus with the aid of vacuum assistance and fundal pressure. The nose and mouth were suctioned. The rest of the baby delivered. The cord was clamped and cut and the baby was stimulated.   Cord blood was obtained, the placenta delivered intact with a 3 vessel cord. The uterus was exteriorized, cleared of all clots and debris and repaired with #1 chromic in a running locked fashion. A second imbricating layer of 1 chromic was used for uterine strength. The tubes were tented up and ligatedand transected with the ligasure. The uterus was placed back into the abdomen, copious irrigation took place, hemostasis was assured with Bovie cautery. The peritoneum was closed with a 2-0 plain, the fascia was inspected and found to be hemostatic and closed with looped 0 PDS from both angles tied in the middle. The subcutaneous tissue was irrigated, made hemostatic with Bovie cautery and the skin was closed with 4 0 vicryl suture (s). Sponge, lap and needle counts were correct x 2. The patient tolerated the procedure well. Danielle Govea M.D.  6/15/2021 9:11 AM

## 2021-06-15 NOTE — FLOWSHEET NOTE
Patient's abdomen, jorge area and thighs washed off and patient given a clean gown and fresh jorge pads. Abdominal binder applied and ice packs applied to abdomen. Patient reports no pain and denies needs at this time.

## 2021-06-15 NOTE — ANESTHESIA PRE PROCEDURE
Department of Anesthesiology  Preprocedure Note       Name:  Renny Kyle   Age:  31 y.o.  :  1985                                          MRN:  [id number]         Date:  6/15/2021      Surgeon: Rui Tapia):  Xavier Wolf MD    Procedure: Procedure(s):  REPEAT  SECTION WITH BILATBERAL SALPINECTOMY    Medications prior to admission:   Prior to Admission medications    Medication Sig Start Date End Date Taking?  Authorizing Provider   docusate sodium (COLACE) 100 MG capsule Take 100 mg by mouth 2 times daily   Yes Historical Provider, MD   Prenatal MV-Min-Fe Fum-FA-DHA (PRENATAL 1 PO) Take by mouth   Yes Historical Provider, MD   acetaminophen (TYLENOL) 500 MG tablet Take 1,000 mg by mouth every 6 hours as needed for Pain    Historical Provider, MD   etodolac (LODINE) 300 MG capsule Take 1 capsule by mouth every 8 hours 20   RAMIRO Price   cyclobenzaprine (FLEXERIL) 10 MG tablet Take 1 tablet by mouth 3 times daily as needed for Muscle spasms 20   RAMIRO Price       Current medications:    Current Facility-Administered Medications   Medication Dose Route Frequency Provider Last Rate Last Admin    lactated ringers infusion   Intravenous Continuous Xavier Wolf  mL/hr at 06/15/21 0647 New Bag at 06/15/21 0647    lactated ringers bolus  1,000 mL Intravenous Once Xavier Wolf MD        sodium chloride flush 0.9 % injection 10 mL  10 mL Intravenous 2 times per day Xavier Wolf MD        sodium chloride flush 0.9 % injection 10 mL  10 mL Intravenous PRN Xavier Wolf MD        0.9 % sodium chloride infusion  25 mL Intravenous PRN Xavier Wolf MD        oxytocin (PITOCIN) 10 unit bolus from the bag  10 Units Intravenous PRN Xavier Wolf MD        And    oxytocin (PITOCIN) 30 units in 500 mL infusion  87.3 micha-units/min Intravenous Continuous PRN Xavier Wolf MD        ondansetron (ZOFRAN) injection 4 mg 4 mg Intravenous Q6H PRN Serjio Zamudio MD         Facility-Administered Medications Ordered in Other Encounters   Medication Dose Route Frequency Provider Last Rate Last Admin    morphine (PF) [facility]) injection   Intraspinal PRN Nelta Cones, APRN - CRNA   0.2 mg at 06/15/21 0746    bupivacaine 0.75% in dextrose 8.25% (intrathecal) (SENSORCAINE) 0.75-8.25 % injection   Spinal/Regional PRN Nelta Cones, APRN - CRNA   1.6 mL at 06/15/21 0746    ondansetron (ZOFRAN) injection   Intravenous PRN Nelta Cones, APRN - CRNA   4 mg at 06/15/21 0741    phenylephrine (DEYSI-SYNEPHRINE) 1 MG/10ML prefilled syringe   Intravenous PRN Nelta Cones, APRN - CRNA   100 mcg at 06/15/21 0801    oxytocin (PITOCIN) injection   Intravenous PRN Nelta Cones, APRN - CRNA   2 mL at 06/15/21 0809       Allergies: Allergies   Allergen Reactions    Erythromycin Nausea And Vomiting       Problem List:    Patient Active Problem List   Diagnosis Code    S/P  K37.470    Blister T14. 8XXA    IUGR (intrauterine growth retardation) [id number]    Major depressive disorder, recurrent severe without psychotic features ([facility] [address] F33.2       Past Medical History:        Diagnosis Date    Complication of anesthesia     headache after last spinal    Postpartum depression     history of depression       Past Surgical History:        Procedure Laterality Date    ARM SURGERY       SECTION  ,     TONSILLECTOMY      WRIST SURGERY  age 10       Social History:    Social History     Tobacco Use    Smoking status: Current Every Day Smoker     Packs/day: 0.25     Types: Cigarettes    Smokeless tobacco: Never Used   Substance Use Topics    Alcohol use: Yes     Comment: occasional                                Ready to quit: Not Answered  Counseling given: Not Answered      Vital Signs (Current):   Vitals:    06/15/21 0520 06/15/21 0524 06/15/21 0715 06/15/21 0716   BP: 108/70   126/71   Pulse: 102   94 Evaluation  Patient summary reviewed and Nursing notes reviewed  Airway: Mallampati: II  TM distance: >3 FB   Neck ROM: full  Mouth opening: > = 3 FB Dental: normal exam         Pulmonary:Negative Pulmonary ROS and normal exam                               Cardiovascular:Negative CV ROS                      Neuro/Psych:   (+) psychiatric history:depression/anxiety             GI/Hepatic/Renal: Neg GI/Hepatic/Renal ROS            Endo/Other: Negative Endo/Other ROS                    Abdominal:           Vascular: negative vascular ROS. Anesthesia Plan      spinal     ASA 2             Anesthetic plan and risks discussed with patient, spouse and mother. Plan discussed with CRNA and attending.     Attending anesthesiologist reviewed and agrees with JACKLYN Leblanc - NAVI   6/15/2021

## 2021-06-15 NOTE — PROGRESS NOTES
While checking patient's fundus, pt passed and clot about the size of a golf ball. RN washed jorge area and performed castillo care. Jorge pads changed. Patient tolerated well.  Ice to abdominal incision

## 2021-06-15 NOTE — FLOWSHEET NOTE
Dr. Tito Odom updated on patient status. Patient is prepped and ready to go for her 0730 . Baby is reactive, patient is having the occasional ctx, vital signs all wdnl. RN will continue with poc.

## 2021-06-16 LAB — HEMOGLOBIN: 10 GM/DL (ref 12–16)

## 2021-06-16 PROCEDURE — 1220000000 HC SEMI PRIVATE OB R&B

## 2021-06-16 PROCEDURE — 6360000002 HC RX W HCPCS: Performed by: ANESTHESIOLOGY

## 2021-06-16 PROCEDURE — 85018 HEMOGLOBIN: CPT

## 2021-06-16 PROCEDURE — 6370000000 HC RX 637 (ALT 250 FOR IP): Performed by: OBSTETRICS & GYNECOLOGY

## 2021-06-16 PROCEDURE — 2580000003 HC RX 258: Performed by: OBSTETRICS & GYNECOLOGY

## 2021-06-16 PROCEDURE — 36415 COLL VENOUS BLD VENIPUNCTURE: CPT

## 2021-06-16 RX ORDER — IBUPROFEN 800 MG/1
800 TABLET ORAL EVERY 8 HOURS
Status: DISCONTINUED | OUTPATIENT
Start: 2021-06-16 | End: 2021-06-18 | Stop reason: HOSPADM

## 2021-06-16 RX ADMIN — OXYCODONE 10 MG: 5 TABLET ORAL at 18:30

## 2021-06-16 RX ADMIN — IBUPROFEN 800 MG: 800 TABLET, FILM COATED ORAL at 19:46

## 2021-06-16 RX ADMIN — ACETAMINOPHEN 1000 MG: 500 TABLET ORAL at 17:10

## 2021-06-16 RX ADMIN — ACETAMINOPHEN 1000 MG: 500 TABLET ORAL at 08:53

## 2021-06-16 RX ADMIN — DOCUSATE SODIUM 100 MG: 100 CAPSULE, LIQUID FILLED ORAL at 19:45

## 2021-06-16 RX ADMIN — DOCUSATE SODIUM 100 MG: 100 CAPSULE, LIQUID FILLED ORAL at 08:53

## 2021-06-16 RX ADMIN — SODIUM CHLORIDE, POTASSIUM CHLORIDE, SODIUM LACTATE AND CALCIUM CHLORIDE: 600; 310; 30; 20 INJECTION, SOLUTION INTRAVENOUS at 02:20

## 2021-06-16 RX ADMIN — PRENATAL WITH FERROUS FUM AND FOLIC ACID 1 TABLET: 3080; 920; 120; 400; 22; 1.84; 3; 20; 10; 1; 12; 200; 27; 25; 2 TABLET ORAL at 08:53

## 2021-06-16 RX ADMIN — IBUPROFEN 800 MG: 800 TABLET, FILM COATED ORAL at 12:09

## 2021-06-16 RX ADMIN — OXYCODONE 10 MG: 5 TABLET ORAL at 22:15

## 2021-06-16 RX ADMIN — KETOROLAC TROMETHAMINE 30 MG: 30 INJECTION, SOLUTION INTRAMUSCULAR; INTRAVENOUS at 05:43

## 2021-06-16 ASSESSMENT — PAIN DESCRIPTION - DESCRIPTORS
DESCRIPTORS: ACHING;DISCOMFORT
DESCRIPTORS: ACHING;DISCOMFORT

## 2021-06-16 ASSESSMENT — PAIN SCALES - GENERAL
PAINLEVEL_OUTOF10: 3
PAINLEVEL_OUTOF10: 8
PAINLEVEL_OUTOF10: 4
PAINLEVEL_OUTOF10: 5
PAINLEVEL_OUTOF10: 7
PAINLEVEL_OUTOF10: 3
PAINLEVEL_OUTOF10: 5
PAINLEVEL_OUTOF10: 1
PAINLEVEL_OUTOF10: 7

## 2021-06-16 ASSESSMENT — PAIN DESCRIPTION - PROGRESSION
CLINICAL_PROGRESSION: GRADUALLY WORSENING
CLINICAL_PROGRESSION: GRADUALLY WORSENING

## 2021-06-16 ASSESSMENT — PAIN DESCRIPTION - ORIENTATION
ORIENTATION: LOWER
ORIENTATION: LOWER

## 2021-06-16 ASSESSMENT — PAIN DESCRIPTION - FREQUENCY
FREQUENCY: CONTINUOUS
FREQUENCY: CONTINUOUS

## 2021-06-16 ASSESSMENT — PAIN DESCRIPTION - LOCATION
LOCATION: ABDOMEN;INCISION
LOCATION: ABDOMEN;INCISION

## 2021-06-16 ASSESSMENT — PAIN - FUNCTIONAL ASSESSMENT
PAIN_FUNCTIONAL_ASSESSMENT: ACTIVITIES ARE NOT PREVENTED
PAIN_FUNCTIONAL_ASSESSMENT: ACTIVITIES ARE NOT PREVENTED

## 2021-06-16 ASSESSMENT — PAIN DESCRIPTION - PAIN TYPE
TYPE: ACUTE PAIN;SURGICAL PAIN
TYPE: SURGICAL PAIN
TYPE: SURGICAL PAIN

## 2021-06-16 ASSESSMENT — PAIN DESCRIPTION - ONSET
ONSET: ON-GOING
ONSET: ON-GOING

## 2021-06-16 NOTE — ANESTHESIA POSTPROCEDURE EVALUATION
Department of Anesthesiology  Postprocedure Note    Patient: Garlon Runner  MRN: [id number]  YOB: 1985  Date of evaluation: 2021  Time:  9:27 AM     Procedure Summary     Date: 06/15/21 Room / Location: [facility]  [facility]    Anesthesia Start: 7595 Anesthesia Stop: 8507    Procedure: REPEAT  SECTION WITH BILATBERAL [address] (Bilateral Uterus) Diagnosis: (REPEAT)    Surgeons: Chelo Layne MD Responsible Provider: Moe Bazzi DO    Anesthesia Type: spinal ASA Status: 2          Anesthesia Type: spinal    Danny Phase I: Danny Score: 9    Danny Phase II: Danny Score: 9    Last vitals: Reviewed and per EMR flowsheets.        Anesthesia Post Evaluation    Patient location during evaluation: bedside  Patient participation: complete - patient participated  Level of consciousness: awake and alert  Airway patency: patent  Nausea & Vomiting: no nausea and no vomiting  Complications: no  Cardiovascular status: hemodynamically stable  Respiratory status: spontaneous ventilation, room air and nonlabored ventilation  Hydration status: stable

## 2021-06-16 NOTE — PROGRESS NOTES
Progress note    Subjective:     Postpartum Day 1:  Delivery    Pain is moderately controlled with current medications. The patient is ambulating well. The patient is tolerating a normal diet. Flatus has not been passed. Objective:     Vitals:    21 1202   BP: 124/77   Pulse: 89   Resp: 18   Temp: 97.8 °F (36.6 °C)   SpO2:          General:    alert, appears stated age and cooperative   Uterine Fundus:   firm   Incision:  covered        CBC   Lab Results   Component Value Date    HGB 10.0 (L) 2021        Assessment:     Status post  section. Doing well postoperatively. Plan:     Advance diet. Remove IV. Remove castillo.       Fern Holbrook MD 2021 1:03 PM

## 2021-06-16 NOTE — FLOWSHEET NOTE
Pt up to the bathroom voided without difficulty scant amount of vaginal bleeding noted. Pt back to bed.

## 2021-06-16 NOTE — PLAN OF CARE
Problem: Discharge Planning:  Goal: Discharged to appropriate level of care  Description: Discharged to appropriate level of care  9/59/5951 7449 by Jen Conway RN  Outcome: Ongoing  Note: Working towards discharge home with family; needs addressed with mother; ducks in a row discussed        Problem: Fluid Volume - Imbalance:  Goal: Absence of postpartum hemorrhage signs and symptoms  Description: Absence of postpartum hemorrhage signs and symptoms  7/03/4525 7838 by Jen Conway RN  Outcome: Ongoing  Note: Small amount of lochia rubra noted. Vitals stable.        Problem: Infection - Surgical Site:  Goal: Will show no infection signs and symptoms  Description: Will show no infection signs and symptoms  2/46/5140 9872 by Jen Conway RN  Outcome: Ongoing  Note: Vital WNL; no s/sx of infection noted;silver dressing intact     Problem: Mood - Altered:  Goal: Mood stable  Description: Mood stable  1/32/6069 2016 by Jen Conway RN  Outcome: Ongoing  Note: Calm and cooperative; bonding well with infant       Problem: Nausea/Vomiting:  Goal: Absence of nausea/vomiting  Description: Absence of nausea/vomiting  9/29/4798 6961 by Jen Conway RN  Outcome: Ongoing  Note: No complaints of n/v     Problem: Pain - Acute:  Goal: Pain level will decrease  Description: Pain level will decrease  1/23/6663 5866 by Jen Conway RN  Outcome: Ongoing  Note: Managing pain with Toradol and rest; Maintaining pain within pain goal of 4/10 with interventions       Problem: Urinary Retention:  Goal: Urinary elimination within specified parameters  Description: Urinary elimination within specified parameters  4/23/2140 2367 by Jen Conway RN  Outcome: Ongoing  Note: Mason intact and draining     Problem: Venous Thromboembolism:  Goal: Will show no signs or symptoms of venous thromboembolism  Description: Will show no signs or symptoms of venous thromboembolism  1/98/2985 0658 by Jen Conway RN  Outcome: Ongoing  Note: Nico negative; patient wearing bilateral SCD     Plan of care discussed with mother and she contributes to goal setting and voices understanding of plan of care.

## 2021-06-16 NOTE — PLAN OF CARE
Problem: Discharge Planning:  Goal: Discharged to appropriate level of care  Description: Discharged to appropriate level of care  6/16/2021 0912 by Mingo Milner RN  Outcome: Ongoing  Note: Working toward discharge     Problem: Fluid Volume - Imbalance:  Goal: Absence of postpartum hemorrhage signs and symptoms  Description: Absence of postpartum hemorrhage signs and symptoms  6/16/2021 0912 by Mingo Milner RN  Outcome: Ongoing  Note: Pt having small amount of vaginal bleeding     Problem: Infection - Surgical Site:  Goal: Will show no infection signs and symptoms  Description: Will show no infection signs and symptoms  6/16/2021 0912 by Mingo Milner RN  Outcome: Ongoing  Note: Vital signs stable. No foul vaginal discharge. Dressing intact. Problem: Mood - Altered:  Goal: Mood stable  Description: Mood stable  6/16/2021 0912 by Mingo Milner RN  Outcome: Ongoing  Note: Pt calm and cooperative     Problem: Nausea/Vomiting:  Goal: Absence of nausea/vomiting  Description: Absence of nausea/vomiting  6/16/2021 0912 by Mingo Milner RN  Outcome: Ongoing  Note: No problems noted. Meds ordered as needed     Problem: Pain - Acute:  Goal: Pain level will decrease  Description: Pain level will decrease  6/16/2021 0912 by Mingo Milner RN  Outcome: Ongoing  Note: Pt taking Motrin, Tylenol  and Haydee for pain. Also wearing abdominal binder. Stated pain goal 4/10. Pain well controlled. Problem: Venous Thromboembolism:  Goal: Will show no signs or symptoms of venous thromboembolism  Description: Will show no signs or symptoms of venous thromboembolism  6/16/2021 0912 by Mingo Milner RN  Outcome: Ongoing  Note: Hilton's negative     Problem: Pain:  Goal: Pain level will decrease  Description: Pain level will decrease  6/16/2021 0912 by Mingo Milner RN  Outcome: Ongoing  Note: Pt taking Motrin, Tylenol  and Haydee for pain. Also wearing abdominal binder.  Stated pain goal 4/10. Pain well controlled. Problem: Urinary Retention:  Goal: Urinary elimination within specified parameters  Description: Urinary elimination within specified parameters  6/16/2021 0912 by Perla Vaughn RN  Outcome: Completed  Note: Pt voiding without difficulty     Problem: Pain:  Goal: Control of acute pain  Description: Control of acute pain  Outcome: Completed  Note: See charting above for pain     Problem: Pain:  Goal: Control of chronic pain  Description: Control of chronic pain  Outcome: Completed  Note: Pt does not have chronic pain. Care plan reviewed with patient and she contributes to goal setting and voices understanding of plan of care.

## 2021-06-16 NOTE — FLOWSHEET NOTE
Pt up to bathroom with assist for first time to void a moderate amount without difficulty. Small amount of rubra lochia. Pt instructed on jorge care, voiced understanding. Pt returned to bed. Tolerated well. Pt denied needs at this time.

## 2021-06-17 LAB
BASOPHILS # BLD: 0.4 %
BASOPHILS ABSOLUTE: 0.1 THOU/MM3 (ref 0–0.1)
EOSINOPHIL # BLD: 1.9 %
EOSINOPHILS ABSOLUTE: 0.3 THOU/MM3 (ref 0–0.4)
ERYTHROCYTE [DISTWIDTH] IN BLOOD BY AUTOMATED COUNT: 13 % (ref 11.5–14.5)
ERYTHROCYTE [DISTWIDTH] IN BLOOD BY AUTOMATED COUNT: 48.7 FL (ref 35–45)
HCT VFR BLD CALC: 33.3 % (ref 37–47)
HEMOGLOBIN: 10.5 GM/DL (ref 12–16)
IMMATURE GRANS (ABS): 0.09 THOU/MM3 (ref 0–0.07)
IMMATURE GRANULOCYTES: 0.6 %
LYMPHOCYTES # BLD: 25 %
LYMPHOCYTES ABSOLUTE: 3.6 THOU/MM3 (ref 1–4.8)
MCH RBC QN AUTO: 32.4 PG (ref 26–33)
MCHC RBC AUTO-ENTMCNC: 31.5 GM/DL (ref 32.2–35.5)
MCV RBC AUTO: 102.8 FL (ref 81–99)
MONOCYTES # BLD: 6.4 %
MONOCYTES ABSOLUTE: 0.9 THOU/MM3 (ref 0.4–1.3)
NUCLEATED RED BLOOD CELLS: 0 /100 WBC
PLATELET # BLD: 271 THOU/MM3 (ref 130–400)
PMV BLD AUTO: 11.1 FL (ref 9.4–12.4)
RBC # BLD: 3.24 MILL/MM3 (ref 4.2–5.4)
SEG NEUTROPHILS: 65.7 %
SEGMENTED NEUTROPHILS ABSOLUTE COUNT: 9.3 THOU/MM3 (ref 1.8–7.7)
WBC # BLD: 14.2 THOU/MM3 (ref 4.8–10.8)

## 2021-06-17 PROCEDURE — 1220000000 HC SEMI PRIVATE OB R&B

## 2021-06-17 PROCEDURE — 85025 COMPLETE CBC W/AUTO DIFF WBC: CPT

## 2021-06-17 PROCEDURE — 36415 COLL VENOUS BLD VENIPUNCTURE: CPT

## 2021-06-17 PROCEDURE — 6370000000 HC RX 637 (ALT 250 FOR IP): Performed by: OBSTETRICS & GYNECOLOGY

## 2021-06-17 RX ADMIN — ACETAMINOPHEN 1000 MG: 500 TABLET ORAL at 18:31

## 2021-06-17 RX ADMIN — OXYCODONE 10 MG: 5 TABLET ORAL at 06:41

## 2021-06-17 RX ADMIN — ACETAMINOPHEN 1000 MG: 500 TABLET ORAL at 01:27

## 2021-06-17 RX ADMIN — ACETAMINOPHEN 1000 MG: 500 TABLET ORAL at 10:21

## 2021-06-17 RX ADMIN — DOCUSATE SODIUM 100 MG: 100 CAPSULE, LIQUID FILLED ORAL at 08:08

## 2021-06-17 RX ADMIN — OXYCODONE 10 MG: 5 TABLET ORAL at 20:03

## 2021-06-17 RX ADMIN — OXYCODONE 10 MG: 5 TABLET ORAL at 14:56

## 2021-06-17 RX ADMIN — IBUPROFEN 800 MG: 800 TABLET, FILM COATED ORAL at 06:40

## 2021-06-17 RX ADMIN — DOCUSATE SODIUM 100 MG: 100 CAPSULE, LIQUID FILLED ORAL at 20:03

## 2021-06-17 RX ADMIN — OXYCODONE 10 MG: 5 TABLET ORAL at 10:21

## 2021-06-17 RX ADMIN — IBUPROFEN 800 MG: 800 TABLET, FILM COATED ORAL at 18:32

## 2021-06-17 ASSESSMENT — PAIN - FUNCTIONAL ASSESSMENT
PAIN_FUNCTIONAL_ASSESSMENT: ACTIVITIES ARE NOT PREVENTED

## 2021-06-17 ASSESSMENT — PAIN SCALES - GENERAL
PAINLEVEL_OUTOF10: 7
PAINLEVEL_OUTOF10: 4
PAINLEVEL_OUTOF10: 4
PAINLEVEL_OUTOF10: 5
PAINLEVEL_OUTOF10: 7
PAINLEVEL_OUTOF10: 5
PAINLEVEL_OUTOF10: 4
PAINLEVEL_OUTOF10: 7
PAINLEVEL_OUTOF10: 7
PAINLEVEL_OUTOF10: 4
PAINLEVEL_OUTOF10: 0

## 2021-06-17 ASSESSMENT — PAIN DESCRIPTION - DESCRIPTORS
DESCRIPTORS: ACHING;DISCOMFORT

## 2021-06-17 ASSESSMENT — PAIN DESCRIPTION - PROGRESSION
CLINICAL_PROGRESSION: GRADUALLY WORSENING

## 2021-06-17 ASSESSMENT — PAIN DESCRIPTION - LOCATION
LOCATION: ABDOMEN;INCISION

## 2021-06-17 ASSESSMENT — PAIN DESCRIPTION - ORIENTATION
ORIENTATION: LOWER
ORIENTATION: ANTERIOR

## 2021-06-17 ASSESSMENT — PAIN DESCRIPTION - PAIN TYPE
TYPE: ACUTE PAIN;SURGICAL PAIN
TYPE: ACUTE PAIN
TYPE: ACUTE PAIN;SURGICAL PAIN
TYPE: ACUTE PAIN;SURGICAL PAIN

## 2021-06-17 ASSESSMENT — PAIN DESCRIPTION - ONSET
ONSET: ON-GOING

## 2021-06-17 ASSESSMENT — PAIN DESCRIPTION - FREQUENCY
FREQUENCY: CONTINUOUS

## 2021-06-17 NOTE — DISCHARGE SUMMARY
Obstetrical Discharge Form      Pt Name: Amalia Fung  MRN: [id number] [place] #: [de-identified]  YOB: 1985      Admitting Diagnosis  IUP  OB History        5    Para   4    Term   3       1    AB        Living   5       SAB        TAB        Ectopic        Molar        Multiple   0    Live Births   4                Reasons for Admission on 6/15/2021  4:54 AM  Single delivery by  [O82]  Repeat low transverse C section rr bilateral salpingectomy for previous uterine incision      Intrapartum Procedures                          Postpartum/Operative Complications        Data  Information for the patient's :  Dorita Cadena [[id number]]   male   Birth Weight: 6 lb 7.4 oz (2.93 kg)       Discharge Diagnosis     Status post . Discharge Information  Current Discharge Medication List      CONTINUE these medications which have NOT CHANGED    Details   docusate sodium (COLACE) 100 MG capsule Take 100 mg by mouth 2 times daily      Prenatal MV-Min-Fe Fum-FA-DHA (PRENATAL 1 PO) Take by mouth      acetaminophen (TYLENOL) 500 MG tablet Take 1,000 mg by mouth every 6 hours as needed for Pain      etodolac (LODINE) 300 MG capsule Take 1 capsule by mouth every 8 hours  Qty: 30 capsule, Refills: 0      cyclobenzaprine (FLEXERIL) 10 MG tablet Take 1 tablet by mouth 3 times daily as needed for Muscle spasms  Qty: 20 tablet, Refills: 0             No discharge procedures on file. Status post : Low Cervical, Transverse  Discharge to:  Home  Condition Good  Regular diet. Follow up in 1 week.     Electronically signed by Lluvia Neumann MD on 21 at 8:03 AM EDT

## 2021-06-17 NOTE — PLAN OF CARE
Care plan reviewed with patient . Patient   Problem: Discharge Planning:  Goal: Discharged to appropriate level of care  Description: Discharged to appropriate level of care  6/17/2021 1009 by Severa Forehand, RN  Outcome: Ongoing  Note: Pt voices understanding of tasks to be completed before discharge. 6/17/2021 0000 by Magdy Tobias RN  Outcome: Ongoing  Note: Remains in hospital, discussed possible discharge needs. Problem: Fluid Volume - Imbalance:  Goal: Absence of postpartum hemorrhage signs and symptoms  Description: Absence of postpartum hemorrhage signs and symptoms  6/17/2021 1009 by Severa Forehand, RN  Outcome: Ongoing  Note: Pt. Lochia within normal limits. 6/17/2021 0000 by Magdy Tobias RN  Outcome: Ongoing  Note: Vaginal bleeding WNL, Fundus firm and midline, no clots or foul odors. Problem: Infection - Surgical Site:  Goal: Will show no infection signs and symptoms  Description: Will show no infection signs and symptoms  6/17/2021 1009 by Severa Forehand, RN  Outcome: Ongoing  Note: Pt is afebrile,  Vitals within normal limits,  Shows no signs or symptoms of infection   6/17/2021 0000 by Magdy Tobias RN  Outcome: Ongoing  Note: Vital signs and assessments WNL. Problem: Mood - Altered:  Goal: Mood stable  Description: Mood stable  6/17/2021 1009 by Severa Forehand, RN  Outcome: Ongoing  Note: Pt calm and cooperative   6/17/2021 0000 by Magdy Tobias RN  Outcome: Ongoing  Note: Bonding with baby, participating in infant care.        Problem: Nausea/Vomiting:  Goal: Absence of nausea/vomiting  Description: Absence of nausea/vomiting  6/17/2021 1009 by Severa Forehand, RN  Outcome: Ongoing  Note: Pt has no complaints of nausea or vomiting  6/17/2021 0000 by Magdy Tobias RN  Outcome: Ongoing  Note: No nausea noted     Problem: Pain - Acute:  Goal: Pain level will decrease  Description: Pain level will decrease  6/17/2021 1009 by Severa Forehand, RN  Outcome: Ongoing  Note: Pt states she is comfortable  Taking pain medications as ordered   6/17/2021 0000 by Jazmin Rosen RN  Outcome: Ongoing  Note: Pain controlled with po meds. Discussed ice for perineal pain and/or incisional pain or the use of warm blanket/heating pad for uterine cramps. Pt states her pain goal 4/10 has been met. Problem: Venous Thromboembolism:  Goal: Will show no signs or symptoms of venous thromboembolism  Description: Will show no signs or symptoms of venous thromboembolism  6/17/2021 1009 by Gwendolyn Neil RN  Outcome: Ongoing  Note: Pt has negative homans  6/17/2021 0000 by Jazmin Rosen RN  Outcome: Ongoing  Note: Sonia Roughen sign negative       Problem: Pain:  Goal: Pain level will decrease  Description: Pain level will decrease  6/17/2021 1009 by Gwendolyn Neil RN  Outcome: Ongoing  Note: Pt states she is comfortable  Taking pain medications as ordered   6/17/2021 0000 by Jazmin Rosen RN  Outcome: Ongoing  Note: Pain controlled with po meds. Discussed ice for perineal pain and/or incisional pain or the use of warm blanket/heating pad for uterine cramps. Pt states her pain goal 4/10 has been met. verbalize understanding of the plan of care and contribute to goal setting.

## 2021-06-17 NOTE — PROGRESS NOTES
Progress note    Subjective:     Postpartum Day 2:  Delivery    Pain is well controlled with current medications. The patient is ambulating well. The patient is tolerating a normal diet. Flatus has been passed. She is tolerating normal diet without nausea and vomiting. She is breast feeding and bottle feeding. She has no concerns at this time. She would like to go home today if able. Objective:     Vitals:    21 0012   BP: 127/72   Pulse: 109   Resp: 16   Temp: 98.1 °F (36.7 °C)   SpO2: 96%         General:    alert, appears stated age and cooperative   Uterine Fundus:   firm   Incision:  healing well, no significant drainage, no dehiscence, no significant erythema        CBC   Lab Results   Component Value Date    WBC 14.2 (H) 2021    HGB 10.5 (L) 2021    HCT 33.3 (L) 2021     2021        Assessment:     Status post  section. Doing well postoperatively.       Plan:     Discharge home with standard precautions and return to clinic in 1 week    Alyssa Castrejon MD 2021 7:39 AM

## 2021-06-17 NOTE — PLAN OF CARE
Problem: Discharge Planning:  Goal: Discharged to appropriate level of care  Description: Discharged to appropriate level of care  Outcome: Ongoing  Note: Remains in hospital, discussed possible discharge needs. Problem: Fluid Volume - Imbalance:  Goal: Absence of postpartum hemorrhage signs and symptoms  Description: Absence of postpartum hemorrhage signs and symptoms  Outcome: Ongoing  Note: Vaginal bleeding WNL, Fundus firm and midline, no clots or foul odors. Problem: Infection - Surgical Site:  Goal: Will show no infection signs and symptoms  Description: Will show no infection signs and symptoms  Outcome: Ongoing  Note: Vital signs and assessments WNL. Problem: Mood - Altered:  Goal: Mood stable  Description: Mood stable  Outcome: Ongoing  Note: Bonding with baby, participating in infant care. Problem: Nausea/Vomiting:  Goal: Absence of nausea/vomiting  Description: Absence of nausea/vomiting  Outcome: Ongoing  Note: No nausea noted     Problem: Pain - Acute:  Goal: Pain level will decrease  Description: Pain level will decrease  Outcome: Ongoing  Note: Pain controlled with po meds. Discussed ice for perineal pain and/or incisional pain or the use of warm blanket/heating pad for uterine cramps. Pt states her pain goal 4/10 has been met. Problem: Venous Thromboembolism:  Goal: Will show no signs or symptoms of venous thromboembolism  Description: Will show no signs or symptoms of venous thromboembolism  Outcome: Ongoing  Note: Homans sign negative       Problem: Pain:  Goal: Pain level will decrease  Description: Pain level will decrease  Outcome: Ongoing  Note: Pain controlled with po meds. Discussed ice for perineal pain and/or incisional pain or the use of warm blanket/heating pad for uterine cramps. Pt states her pain goal 4/10 has been met. Care plan reviewed with patient and she contributes to goal setting and voices understanding of plan of care.

## 2021-06-17 NOTE — FLOWSHEET NOTE
Infant has roomed in this shift. Progress Note - Infectious Disease   Mireille Sosa 63 y o  female MRN: [id number]  Unit/Bed#: E5 -01 Encounter: [id number]      Impression/Plan:  1  Right great toe cellulitis  Secondary to chronic right toe wound with underlying osteomyelitis  Fortunately the patient has no fever or leukocytosis  Her blood cultures are negative after 24 hours  She does have a prior history of MRSA skin infections  She is currently receiving IV vancomycin and PO Flagyl and is tolerating without difficulty  The patient is following closely with Podiatry   -continue IV vancomycin  -continue PO Flagyl  -monitor CBC and BMP  -monitor vitals  -serial right foot exams  -continue follow-up with Podiatry     2  Chronic right great toe wound with osteomyelitis of distal phalanx  As evidenced on patient's x-ray  Patient will need at least amputation of distal phalanx  Given very poor quality of soft tissue of the whole toe, she may need amputation of the whole toe  She will proceed with an MRI to assess for how much bone is involved  She continues to follow closely with Podiatry   -antibiotics as above  -monitor CBC and BMP  -monitor vitals  -follow-up MRI of the right foot  -serial right foot exams  -local wound care per Podiatry  -continue close follow-up with Podiatry     3  Uncontrolled type 2 diabetes mellitus with hyperglycemia  Per patient she has not been taking any medication for diabetes for quite a while  Her last hemoglobin A1c was 9% on 11/12/2018  Her blood glucose was greater than 600 upon admission  This is risk factor for wound and infection above  Recommend patient committed tighter glycemic control for overall health, especially in the setting of wound infection   -blood glucose management per primary service     4  Peripheral neuropathy  Secondary to uncontrolled type 2 diabetes mellitus     This is risk factor for wound above       Above plan was discussed in detail with patient at the bedside  Antibiotics:  Vancomycin 3  Flagyl 2  Antibiotics 3    Subjective:  Patient reports that normally she cannot feel her feet but she is feeling a lot of pain in her R great toe  Reports that he ulcer seems dry today but the dressing keeps falling off of her foot; she has not yet gone down to have her MRI; she has no fever, chills, sweats, shakes; no nausea, vomiting, abdominal pain, diarrhea, or dysuria; no cough, shortness of breath, or chest pain  No new symptoms  Objective:  Vitals:  Temp:  [97 4 °F (36 3 °C)-97 6 °F (36 4 °C)] 97 6 °F (36 4 °C)  HR:  [68-74] 69  Resp:  [18-20] 20  BP: (119-133)/(62-78) 133/62  SpO2:  [98 %-100 %] 100 %  Temp (24hrs), Av 5 °F (36 4 °C), Min:97 4 °F (36 3 °C), Max:97 6 °F (36 4 °C)  Current: Temperature: 97 6 °F (36 4 °C)    Physical Exam:   General Appearance:  Alert, interactive, nontoxic, no acute distress  Throat: Oropharynx moist without lesions  Lungs:   Clear to auscultation bilaterally; no wheezes, rhonchi or rales; respirations unlabored   Heart:  RRR; no murmur, rub or gallop   Abdomen:   Soft, non-tender, non-distended, positive bowel sounds  Extremities: No clubbing or cyanosis, no edema   Skin: No new rashes, lesions, or draining wounds noted on exposed skin   Distal R great toe wound with dry base, eschar and some yellow patches, no active bleeding/drainage, betadine paint appioed     Labs, Imaging, & Other studies:   All pertinent labs and imaging studies were personally reviewed  Results from last 7 days   Lab Units 19  0610 19  0538 19  1149   WBC Thousand/uL 7 30 9 95 7 96   HEMOGLOBIN g/dL 9 4* 10 1* 10 6*   PLATELETS Thousands/uL 319 313 306     Results from last 7 days   Lab Units 19  0610  19  1149   POTASSIUM mmol/L 4 3   < > 5 0   CHLORIDE mmol/L 101   < > 92*   CO2 mmol/L 28   < > 23   BUN mg/dL 20   < > 25   CREATININE mg/dL 0 83   < > 1 24   EGFR ml/min/1 73sq m 75   < > 46   CALCIUM mg/dL 9 7   < > 10 0   AST U/L  --   --  16   ALT U/L  --   --  20   ALK PHOS U/L  --   --  167*    < > = values in this interval not displayed  Results from last 7 days   Lab Units 12/08/19  1341 12/07/19  1149 12/07/19  1131   BLOOD CULTURE   --  No Growth at 24 hrs  No Growth at 24 hrs     GRAM STAIN RESULT  1+ Polys*  2+ Gram negative rods*  --   --

## 2021-06-18 VITALS
BODY MASS INDEX: 34.23 KG/M2 | RESPIRATION RATE: 18 BRPM | SYSTOLIC BLOOD PRESSURE: 121 MMHG | DIASTOLIC BLOOD PRESSURE: 59 MMHG | OXYGEN SATURATION: 100 % | HEART RATE: 109 BPM | WEIGHT: 186 LBS | HEIGHT: 62 IN | TEMPERATURE: 98.1 F

## 2021-06-18 PROCEDURE — 6370000000 HC RX 637 (ALT 250 FOR IP): Performed by: OBSTETRICS & GYNECOLOGY

## 2021-06-18 RX ORDER — OXYCODONE HYDROCHLORIDE AND ACETAMINOPHEN 5; 325 MG/1; MG/1
1 TABLET ORAL EVERY 6 HOURS PRN
Qty: 28 TABLET | Refills: 0 | Status: SHIPPED | OUTPATIENT
Start: 2021-06-18 | End: 2021-06-25

## 2021-06-18 RX ADMIN — IBUPROFEN 800 MG: 800 TABLET, FILM COATED ORAL at 13:29

## 2021-06-18 RX ADMIN — ACETAMINOPHEN 1000 MG: 500 TABLET ORAL at 04:30

## 2021-06-18 RX ADMIN — DOCUSATE SODIUM 100 MG: 100 CAPSULE, LIQUID FILLED ORAL at 09:16

## 2021-06-18 RX ADMIN — IBUPROFEN 800 MG: 800 TABLET, FILM COATED ORAL at 04:31

## 2021-06-18 RX ADMIN — OXYCODONE 10 MG: 5 TABLET ORAL at 09:16

## 2021-06-18 RX ADMIN — OXYCODONE 5 MG: 5 TABLET ORAL at 13:29

## 2021-06-18 RX ADMIN — OXYCODONE 10 MG: 5 TABLET ORAL at 04:31

## 2021-06-18 RX ADMIN — PRENATAL WITH FERROUS FUM AND FOLIC ACID 1 TABLET: 3080; 920; 120; 400; 22; 1.84; 3; 20; 10; 1; 12; 200; 27; 25; 2 TABLET ORAL at 09:16

## 2021-06-18 ASSESSMENT — PAIN DESCRIPTION - LOCATION
LOCATION: INCISION
LOCATION: ABDOMEN;INCISION

## 2021-06-18 ASSESSMENT — PAIN DESCRIPTION - PROGRESSION
CLINICAL_PROGRESSION: GRADUALLY WORSENING
CLINICAL_PROGRESSION: GRADUALLY WORSENING

## 2021-06-18 ASSESSMENT — PAIN DESCRIPTION - PAIN TYPE
TYPE: ACUTE PAIN;SURGICAL PAIN
TYPE: SURGICAL PAIN

## 2021-06-18 ASSESSMENT — PAIN DESCRIPTION - DESCRIPTORS
DESCRIPTORS: ACHING;DISCOMFORT
DESCRIPTORS: SORE

## 2021-06-18 ASSESSMENT — PAIN DESCRIPTION - FREQUENCY
FREQUENCY: CONTINUOUS
FREQUENCY: CONTINUOUS

## 2021-06-18 ASSESSMENT — PAIN SCALES - GENERAL
PAINLEVEL_OUTOF10: 7
PAINLEVEL_OUTOF10: 0
PAINLEVEL_OUTOF10: 8
PAINLEVEL_OUTOF10: 8
PAINLEVEL_OUTOF10: 7

## 2021-06-18 ASSESSMENT — PAIN DESCRIPTION - ONSET
ONSET: ON-GOING
ONSET: ON-GOING

## 2021-06-18 ASSESSMENT — PAIN DESCRIPTION - ORIENTATION: ORIENTATION: ANTERIOR

## 2021-06-18 NOTE — PROGRESS NOTES
Progress note    Subjective:     Postpartum Day 3:  Delivery    Pain is moderately controlled with current medications. The patient is ambulating well. The patient is tolerating a normal diet. Flatus has been passed. Objective:     Vitals:    21 0429   BP: 126/72   Pulse: 100   Resp: 16   Temp: 98.6 °F (37 °C)   SpO2: 100%         General:    alert, appears stated age and cooperative   Uterine Fundus:   firm   Incision:  covered        CBC   Lab Results   Component Value Date    WBC 14.2 (H) 2021    HGB 10.5 (L) 2021    HCT 33.3 (L) 2021     2021        Assessment:     Status post  section. Doing well postoperatively. Plan:     Discharge home with standard precautions and return to clinic in 4-6 weeks and 1 week.     Ericka Navarro MD 2021 8:46 AM

## 2021-06-18 NOTE — PLAN OF CARE
Problem: Discharge Planning:  Goal: Discharged to appropriate level of care  Description: Discharged to appropriate level of care  6/18/2021 1150 by Kristen Moncada RN  Outcome: Ongoing  Note: Working towards discharge home today, ducks in a row disucssed     Problem: Fluid Volume - Imbalance:  Goal: Absence of postpartum hemorrhage signs and symptoms  Description: Absence of postpartum hemorrhage signs and symptoms  6/18/2021 1150 by Kristen Moncada RN  Outcome: Ongoing  Note: Small amount of vaginal bleeding noted, no clots passed. Fundus firm one fingersbreath below the umbilicus     Problem: Infection - Surgical Site:  Goal: Will show no infection signs and symptoms  Description: Will show no infection signs and symptoms  6/18/2021 1150 by Kristen Moncada RN  Outcome: Ongoing  Note: No signs or symptoms of infection noted     Problem: Mood - Altered:  Goal: Mood stable  Description: Mood stable  6/18/2021 1150 by Kristen Moncada RN  Outcome: Ongoing  Note: Mood stable and cooperative with staff     Problem: Pain - Acute:  Goal: Pain level will decrease  Description: Pain level will decrease  6/18/2021 1150 by Kristen Moncada RN  Outcome: Ongoing  Note: Incisional pain manageable with oral pain medication. Discussed may use heat and ice for comfort. Pain goal a 4/10 has been met this shift,      Problem: Venous Thromboembolism:  Goal: Will show no signs or symptoms of venous thromboembolism  Description: Will show no signs or symptoms of venous thromboembolism  6/18/2021 1150 by Kristen Moncada RN  Outcome: Ongoing  Note: Negative homans     Problem: Pain:  Goal: Pain level will decrease  Description: Pain level will decrease  6/18/2021 1150 by Kristen Moncada RN  Outcome: Ongoing  Note: Incisional pain manageable with oral pain medication. Discussed may use heat and ice for comfort.    Pain goal a 4/10 has been met this shift,      Problem: Nausea/Vomiting:  Goal: Absence of nausea/vomiting  Description: Absence of nausea/vomiting  6/18/2021 1150 by Alyssa Davenport RN  Outcome: Completed  Note: No nausea or vomiting noted   Care plan reviewed with patient and she contributes to goal setting and voices understanding of plan of care.

## 2021-06-18 NOTE — PLAN OF CARE
Problem: Discharge Planning:  Goal: Discharged to appropriate level of care  Description: Discharged to appropriate level of care  6/17/2021 2321 by James Stone RN  Outcome: Ongoing  Note: Remains in hospital, discussed possible discharge needs. Problem: Fluid Volume - Imbalance:  Goal: Absence of postpartum hemorrhage signs and symptoms  Description: Absence of postpartum hemorrhage signs and symptoms  6/17/2021 2321 by James Stone RN  Outcome: Ongoing  Note: Vaginal bleeding WNL, Fundus firm and midline, no clots or foul odors. Problem: Infection - Surgical Site:  Goal: Will show no infection signs and symptoms  Description: Will show no infection signs and symptoms  6/17/2021 2321 by James Stone RN  Outcome: Ongoing  Note: Vital signs and assessments WNL. Problem: Mood - Altered:  Goal: Mood stable  Description: Mood stable  6/17/2021 2321 by James Stone RN  Outcome: Ongoing  Note: Bonding with baby, participating in infant care. Problem: Nausea/Vomiting:  Goal: Absence of nausea/vomiting  Description: Absence of nausea/vomiting  6/17/2021 2321 by James Stone RN  Outcome: Ongoing  Note: No nausea noted     Problem: Pain - Acute:  Goal: Pain level will decrease  Description: Pain level will decrease  6/17/2021 2321 by James Stone RN  Outcome: Ongoing  Note: Pain controlled with po meds. Discussed ice for perineal pain and/or incisional pain or the use of warm blanket/heating pad for uterine cramps. Pt states her pain goal 4/10 has been met.        Problem: Venous Thromboembolism:  Goal: Will show no signs or symptoms of venous thromboembolism  Description: Will show no signs or symptoms of venous thromboembolism  6/17/2021 2321 by James Stone RN  Outcome: Ongoing  Note: Homans sign negative       Problem: Pain:  Goal: Pain level will decrease  Description: Pain level will decrease  6/17/2021 2321 by James Stone RN  Outcome: Ongoing  Note: Pain controlled with

## 2021-06-18 NOTE — FLOWSHEET NOTE
Discharge prescriptions given to pt with instructions on use and side effects. See AVS. Pt verbalized understanding of medications. Postpartum  teaching completed and forms signed by patient. Copy witnessed by RN and given to patient. Patient verbalized understanding of all teaching points. Patient plans to follow-up with [facility] Provider as instructed. Patient verbalizes understanding of discharge instructions and denies further questions. ID bands checked. Patient discharged in stable condition accompanied by family/guardian. Discharged in wheelchair, holding baby in arms.

## 2021-06-18 NOTE — FLOWSHEET NOTE
Post birth warning signs education paper given and reviewed, teaching complete. [place] postpartum depression screening discussed with patient, instructed to contact her healthcare provider if her score is > 10. Patient voiced understanding. Mother's blood type is A+. Baby's blood type is N/A.   Mother did not receive Rhogam.

## 2021-07-20 ENCOUNTER — NURSE ONLY (OUTPATIENT)
Dept: LAB | Age: 36
End: 2021-07-20

## 2021-07-26 LAB — CYTOLOGY THIN PREP PAP: NORMAL

## 2021-12-02 ENCOUNTER — HOSPITAL ENCOUNTER (EMERGENCY)
Age: 36
Discharge: HOME | End: 2021-12-02
Payer: MEDICARE

## 2021-12-02 VITALS
HEIGHT: 62 IN | OXYGEN SATURATION: 98 % | WEIGHT: 150 LBS | HEART RATE: 101 BPM | RESPIRATION RATE: 14 BRPM | TEMPERATURE: 97.2 F | SYSTOLIC BLOOD PRESSURE: 128 MMHG | BODY MASS INDEX: 27.6 KG/M2 | DIASTOLIC BLOOD PRESSURE: 65 MMHG

## 2021-12-02 DIAGNOSIS — J06.9 VIRAL URI: Primary | ICD-10-CM

## 2021-12-02 DIAGNOSIS — Z20.822 CLOSE EXPOSURE TO COVID-19 VIRUS: ICD-10-CM

## 2021-12-02 LAB — SARS-COV-2, NAA: NOT  DETECTED

## 2021-12-02 PROCEDURE — 99213 OFFICE O/P EST LOW 20 MIN: CPT

## 2021-12-02 PROCEDURE — 87635 SARS-COV-2 COVID-19 AMP PRB: CPT

## 2021-12-02 PROCEDURE — 99202 OFFICE O/P NEW SF 15 MIN: CPT | Performed by: NURSE PRACTITIONER

## 2021-12-02 NOTE — ED PROVIDER NOTES
[address]       Chief Complaint   Patient presents with    Headache     weak request rapid covid       Nurses Notes reviewed and I agree except as noted in the HPI. HISTORY OF PRESENT ILLNESS   Matias Waddell is a 31 y.o. female who presents for evaluation. The history is provided by the patient. URI  Presenting symptoms: fatigue    Duration:  5 days  Associated symptoms: headaches    Risk factors: sick contacts (boyfriend tested positive for COVID-19 today)        The patient/patient representative has no other acute complaints at this time. REVIEW OF SYSTEMS     Review of Systems   Constitutional: Positive for fatigue. Neurological: Positive for headaches. All other systems reviewed and are negative. PAST MEDICAL HISTORY         Diagnosis Date    Complication of anesthesia     headache after last spinal    Postpartum depression     history of depression       SURGICAL HISTORY     Patient  has a past surgical history that includes  section (, ); Wrist surgery (age 10); Tonsillectomy; Arm Surgery; and  section (Bilateral, 6/15/2021). CURRENT MEDICATIONS       Discharge Medication List as of 2021  6:36 PM      CONTINUE these medications which have NOT CHANGED    Details   docusate sodium (COLACE) 100 MG capsule Take 100 mg by mouth 2 times dailyHistorical Med      Prenatal MV-Min-Fe Fum-FA-DHA (PRENATAL 1 PO) Take by mouthHistorical Med      acetaminophen (TYLENOL) 500 MG tablet Take 1,000 mg by mouth every 6 hours as needed for PainHistorical Med      etodolac (LODINE) 300 MG capsule Take 1 capsule by mouth every 8 hours, Disp-30 capsule, R-0Print      cyclobenzaprine (FLEXERIL) 10 MG tablet Take 1 tablet by mouth 3 times daily as needed for Muscle spasms, Disp-20 tablet, R-0Print             ALLERGIES     Patient is is allergic to erythromycin.     FAMILY HISTORY     Patient'sfamily history includes Arthritis in her maternal grandfather and maternal grandmother; Asthma in her father and mother; Diabetes in her maternal grandfather and maternal grandmother; Heart Disease in her father, maternal grandfather, maternal grandmother, and mother; High Blood Pressure in her father, maternal grandfather, maternal grandmother, and mother; Stroke in her father. SOCIAL HISTORY     Patient  reports that she has been smoking cigarettes and e-cigarettes. She has been smoking about 0.50 packs per day. She has never used smokeless tobacco. She reports current alcohol use. She reports previous drug use. Drug: Marijuana Russel Blow). PHYSICAL EXAM     ED TRIAGE VITALS  BP: 128/65, Temp: 97.2 °F (36.2 °C), Pulse: 101, Resp: 14, SpO2: 98 %  Physical Exam  Vitals and nursing note reviewed. Constitutional:       General: She is not in acute distress. Appearance: Normal appearance. She is well-developed and well-groomed. HENT:      Head: Normocephalic and atraumatic. Right Ear: External ear normal.      Left Ear: External ear normal.      Mouth/Throat:      Lips: Pink. Mouth: Mucous membranes are moist.   Eyes:      Conjunctiva/sclera: Conjunctivae normal.      Right eye: Right conjunctiva is not injected. Left eye: Left conjunctiva is not injected. Pupils: Pupils are equal.   Cardiovascular:      Rate and Rhythm: Normal rate. Heart sounds: Normal heart sounds. Pulmonary:      Effort: Pulmonary effort is normal. No respiratory distress. Breath sounds: Normal breath sounds. Musculoskeletal:      Cervical back: Normal range of motion. Skin:     General: Skin is warm and dry. Findings: No rash (on exposed surfaces). Neurological:      Mental Status: She is alert and oriented to person, place, and time. Psychiatric:         Mood and Affect: Mood normal.         Speech: Speech normal.         Behavior: Behavior is cooperative.          DIAGNOSTIC RESULTS   Labs:  Abnormal Labs Reviewed - No abnormal labs to display     IMAGING:  No orders to display     URGENT CARE COURSE:     Vitals:    12/02/21 1742   BP: 128/65   Pulse: 101   Resp: 14   Temp: 97.2 °F (36.2 °C)   SpO2: 98%   Weight: 150 lb (68 kg)   Height: 5' 2\" (1.575 m)       Medications - No data to display  PROCEDURES:  FINALIMPRESSION      1. Viral URI    2. Close exposure to COVID-19 virus        DISPOSITION/PLAN   DISPOSITION      Discharge   Physical assessment findings, diagnostic testing(s) if applicable, and vital signs reviewed with patient/patient representative. If applicable, patient/patient representative will be contacted upon receipt of final culture and sensitivity or other testing results when available. Any additions or changes to medications or changes the plan of care will be made at that time. Differential diagnosis(s) discussed with patient/patient representative. Patient is to follow-up with family care provider in 2-3 days if no improvement. Patient is to go to the emergency department if symptoms change/worsen. Patient/patient representative is aware of care plan, questions answered, verbalizes understanding and is in agreement. Printed instructions attached to after visit summary. Problem List Items Addressed This Visit     None      Visit Diagnoses     Viral URI    -  Primary    Close exposure to COVID-19 virus              PATIENT REFERRED TO:  [facility] [address],[address] [address] [facility] [address] [facility]  Schedule an appointment as soon as possible for a visit in 3 days  if you do not have a family provider, If symptoms change/worsen, go to the -[facility], APRN - CNP    Please note that some or all of this chart was generated using [employer] voice recognition software. Although every effort was made to ensure the accuracy of this automated transcription, some errors in transcription may have occurred.         Skyla LR Darwin Gallego, JACKLYN - CNP  12/04/21 0760

## 2021-12-02 NOTE — ED TRIAGE NOTES
To room 3 c/o headahces tiredness  Weak  \" Boyfriend had a positive home covid test today I couldn't taste thre valencia 5 days ago

## 2021-12-08 ENCOUNTER — HOSPITAL ENCOUNTER (EMERGENCY)
Age: 36
Discharge: HOME | End: 2021-12-08
Payer: MEDICARE

## 2021-12-08 VITALS
BODY MASS INDEX: 27.6 KG/M2 | RESPIRATION RATE: 16 BRPM | HEART RATE: 110 BPM | SYSTOLIC BLOOD PRESSURE: 145 MMHG | OXYGEN SATURATION: 99 % | WEIGHT: 150 LBS | TEMPERATURE: 99.9 F | HEIGHT: 62 IN | DIASTOLIC BLOOD PRESSURE: 79 MMHG

## 2021-12-08 DIAGNOSIS — U07.1 COVID-19: Primary | ICD-10-CM

## 2021-12-08 LAB — SARS-COV-2, NAAT: DETECTED

## 2021-12-08 PROCEDURE — 87635 SARS-COV-2 COVID-19 AMP PRB: CPT

## 2021-12-08 PROCEDURE — 6370000000 HC RX 637 (ALT 250 FOR IP): Performed by: NURSE PRACTITIONER

## 2021-12-08 PROCEDURE — 99282 EMERGENCY DEPT VISIT SF MDM: CPT

## 2021-12-08 RX ORDER — ACETAMINOPHEN 325 MG/1
650 TABLET ORAL ONCE
Status: COMPLETED | OUTPATIENT
Start: 2021-12-08 | End: 2021-12-08

## 2021-12-08 RX ADMIN — ACETAMINOPHEN 650 MG: 325 TABLET ORAL at 17:23

## 2021-12-08 ASSESSMENT — PAIN SCALES - GENERAL
PAINLEVEL_OUTOF10: 4
PAINLEVEL_OUTOF10: 4

## 2021-12-08 ASSESSMENT — PAIN DESCRIPTION - PAIN TYPE: TYPE: ACUTE PAIN

## 2021-12-08 ASSESSMENT — PAIN DESCRIPTION - LOCATION: LOCATION: GENERALIZED

## 2021-12-08 NOTE — Clinical Note
Genesis Gutierrez was seen and treated in our emergency department on 12/8/2021. COVID19 virus is suspected. Per the CDC guidelines we recommend home isolation until the following conditions are all met:    1. At least 10 days have passed since symptoms first appeared and  2. At least 24 hours have passed since last fever without the use of fever-reducing medications and  3. Symptoms (e.g., cough, shortness of breath) have improved    If you have any questions or concerns, please don't hesitate to call.     She may return to work/school on 12/20/2021        Vicky Boles, APRN - CNP

## 2021-12-08 NOTE — ED TRIAGE NOTES
Pt presents to the ED through triage with c/c covid testing. Pt states that she took an at home test and it was positive.  Needs positive test for work excuse

## 2021-12-09 ENCOUNTER — CARE COORDINATION (OUTPATIENT)
Dept: CARE COORDINATION | Age: 36
End: 2021-12-09

## 2021-12-09 ASSESSMENT — ENCOUNTER SYMPTOMS
SHORTNESS OF BREATH: 0
BLOOD IN STOOL: 0
EYE PAIN: 0
DIARRHEA: 0
VOMITING: 0
RHINORRHEA: 0
CONSTIPATION: 0
COUGH: 1
NAUSEA: 0
WHEEZING: 0
ABDOMINAL PAIN: 0

## 2021-12-09 NOTE — ED PROVIDER NOTES
[address] [facility] [address] [address] EMERGENCY DEPT  EMERGENCY MEDICINE     Pt Name: Veronica Guy  MRN: [id number]  [place] 1985  Date of evaluation: 2021  PCP:    No primary care provider on file. Provider: JACKLYN Peña CNP    CHIEF COMPLAINT       Chief Complaint   Patient presents with    Covid Testing           HISTORY OF PRESENT ILLNESS    Dereck Waterman is a 79-year-old female patient that presents to ER for COVID-19 testing. She states that she has a cough, fever, body aches and fatigue. She states that she took a COVID-19 test at home and it was positive. She needs a positive test from the ER to show to her work. She denies chest pain, shortness of breath, nausea, vomiting or diarrhea. Triage notes and Nursing notes were reviewed by myself. Any discrepancies are addressed above.     PAST MEDICAL HISTORY     Past Medical History:   Diagnosis Date    Complication of anesthesia     headache after last spinal    Postpartum depression     history of depression       SURGICAL HISTORY       Past Surgical History:   Procedure Laterality Date    ARM SURGERY       SECTION  ,      SECTION Bilateral 6/15/2021    REPEAT  SECTION WITH BILATBERAL SALPINECTOMY performed by Salvatore Hood MD at [facility]    TONSILLECTOMY      WRIST SURGERY  age 10       CURRENT MEDICATIONS       Discharge Medication List as of 2021  5:21 PM      CONTINUE these medications which have NOT CHANGED    Details   docusate sodium (COLACE) 100 MG capsule Take 100 mg by mouth 2 times dailyHistorical Med      Prenatal MV-Min-Fe Fum-FA-DHA (PRENATAL 1 PO) Take by mouthHistorical Med      acetaminophen (TYLENOL) 500 MG tablet Take 1,000 mg by mouth every 6 hours as needed for PainHistorical Med      etodolac (LODINE) 300 MG capsule Take 1 capsule by mouth every 8 hours, Disp-30 capsule, R-0Print      cyclobenzaprine (FLEXERIL) 10 MG tablet Take 1 tablet by mouth 3 times daily as needed for Muscle spasms, Disp-20 tablet, R-0Print             ALLERGIES       Allergies   Allergen Reactions    Erythromycin Nausea And Vomiting       FAMILY HISTORY       Family History   Problem Relation Age of Onset    Asthma Mother     Heart Disease Mother     High Blood Pressure Mother     Asthma Father     Heart Disease Father     High Blood Pressure Father     Stroke Father     Arthritis Maternal Grandmother     Diabetes Maternal Grandmother     Heart Disease Maternal Grandmother     High Blood Pressure Maternal Grandmother     Arthritis Maternal Grandfather     Diabetes Maternal Grandfather     Heart Disease Maternal Grandfather     High Blood Pressure Maternal Grandfather         SOCIAL HISTORY       Social History     Socioeconomic History    Marital status: Legally      Spouse name: Not on file    Number of children: 4    Years of education: Not on file    Highest education level: Not on file   Occupational History    Not on file   Tobacco Use    Smoking status: Current Every Day Smoker     Packs/day: 0.50     Types: Cigarettes, E-Cigarettes    Smokeless tobacco: Never Used   Vaping Use    Vaping Use: Some days   Substance and Sexual Activity    Alcohol use: Yes     Comment: occasional    Drug use: Not Currently     Types: Marijuana Lucianne Bars)     Comment: not smoked since beginning of pregnancy    Sexual activity: Yes     Partners: Male   Other Topics Concern    Not on file   Social History Narrative    Not on file     Social Determinants of Health     Financial Resource Strain:     Difficulty of Paying Living Expenses: Not on file   Food Insecurity:     Worried About Running Out of Food in the Last Year: Not on file    Doug of Food in the Last Year: Not on file   Transportation Needs:     Lack of Transportation (Medical): Not on file    Lack of Transportation (Non-Medical):  Not on file   Physical Activity:     Days of Exercise per Week: Not on file    Minutes of Exercise per Session: Not on file   Stress:     Feeling of Stress : Not on file   Social Connections:     Frequency of Communication with Friends and Family: Not on file    Frequency of Social Gatherings with Friends and Family: Not on file    Attends Taoist Services: Not on file    Active Member of Clubs or Organizations: Not on file    Attends Club or Organization Meetings: Not on file    Marital Status: Not on file   Intimate Partner Violence:     Fear of Current or Ex-Partner: Not on file    Emotionally Abused: Not on file    Physically Abused: Not on file    Sexually Abused: Not on file   Housing Stability:     Unable to Pay for Housing in the Last Year: Not on file    Number of [place] in the Last Year: Not on file    Unstable Housing in the Last Year: Not on file       REVIEW OF SYSTEMS     Review of Systems   Constitutional: Positive for fatigue and fever. Negative for appetite change, chills and unexpected weight change. HENT: Positive for congestion. Negative for ear pain and rhinorrhea. Eyes: Negative for pain and visual disturbance. Respiratory: Positive for cough. Negative for shortness of breath and wheezing. Cardiovascular: Negative for chest pain, palpitations and leg swelling. Gastrointestinal: Negative for abdominal pain, blood in stool, constipation, diarrhea, nausea and vomiting. Genitourinary: Negative for dysuria, frequency and hematuria. Musculoskeletal: Negative for arthralgias, joint swelling and neck stiffness. Skin: Negative for rash. Neurological: Negative for dizziness, syncope, weakness, light-headedness and headaches. Hematological: Does not bruise/bleed easily. Except as noted above the remainder of the review of systems was reviewed and is negative.   SCREENINGS                        PHYSICAL EXAM    (up to 7 for level 4, 8 or more for level 5)     ED Triage Vitals [12/08/21 1626]   BP Temp Temp Source Pulse Resp SpO2 Height Weight   (!) 145/79 99.9 °F (37.7 °C) Oral 110 16 99 % 5' 2\" (1.575 m) 150 lb (68 kg)       Physical Exam  Vitals and nursing note reviewed. Constitutional:       Appearance: She is well-developed. HENT:      Head: Normocephalic and atraumatic. Eyes:      Conjunctiva/sclera: Conjunctivae normal.      Pupils: Pupils are equal, round, and reactive to light. Cardiovascular:      Rate and Rhythm: Normal rate and regular rhythm. Heart sounds: Normal heart sounds. No murmur heard. No gallop. Pulmonary:      Effort: Pulmonary effort is normal. No respiratory distress. Breath sounds: Normal breath sounds. No wheezing or rales. Abdominal:      General: Bowel sounds are normal.      Palpations: Abdomen is soft. Musculoskeletal:         General: Normal range of motion. Cervical back: Normal range of motion. Skin:     General: Skin is warm and dry. Neurological:      Mental Status: She is alert and oriented to person, place, and time. DIAGNOSTIC RESULTS     EKG:(none if blank)  All EKGs are interpreted by the Emergency Department Physician who either signs or Co-signs this chart in the absence of a cardiologist.        RADIOLOGY: (none if blank)   I directly visualized the following images and reviewed the radiologist interpretations. Interpretation per the Radiologist below, if available at the time of this note:  No orders to display       LABS:  Labs Reviewed   COVID-19, RAPID - Abnormal; Notable for the following components:       Result Value    SARS-CoV-2, NAAT DETECTED (*)     All other components within normal limits       All other labs were within normal range or not returned as of this dictation. Please note, any cultures that may have been sent were not resulted at the time of this patient visit.     EMERGENCY DEPARTMENT COURSE and Medical Decision Making:     Vitals:    Vitals:    12/08/21 1626   BP: (!) 145/79   Pulse: 110   Resp: 16   Temp: 99.9 °F (37.7 °C)   TempSrc: Oral   SpO2: 99% Weight: 150 lb (68 kg)   Height: 5' 2\" (1.575 m)       PROCEDURES: (None if blank)  Procedures       MDM  Number of Diagnoses or Management Options  COVID-19: new, needed workup     Amount and/or Complexity of Data Reviewed  Clinical lab tests: ordered and reviewed    Risk of Complications, Morbidity, and/or Mortality  Presenting problems: minimal  Diagnostic procedures: low  Management options: minimal    Patient Progress  Patient progress: stable    Patient tested positive for COVID-19 both at home and during this visit. Isolation instructions and work note was given. Patient was febrile so she was given a dose of Tylenol. Strict return precautions and follow up instructions were discussed with the patient with which the patient agrees    ED Medications administered this visit:    Medications   acetaminophen (TYLENOL) tablet 650 mg (650 mg Oral Given 12/8/21 8753)         FINAL IMPRESSION      1. COVID-19          DISPOSITION/PLAN   DISPOSITION Decision To Discharge 12/08/2021 05:21:45 PM      PATIENT REFERRED TO:  No follow-up provider specified.     DISCHARGE MEDICATIONS:  Discharge Medication List as of 12/8/2021  5:21 PM                 JACKLYN Shaw CNP (electronically signed)           JACKLYN Shaw CNP  12/09/21 1144

## 2021-12-09 NOTE — CARE COORDINATION
Attempted to reach Tamika Marcelino today for ED f/u [facility] outreach. Received message stating person you are trying to reach is currently unavailable. Unable to leave message.

## 2021-12-10 ENCOUNTER — CARE COORDINATION (OUTPATIENT)
Dept: CARE COORDINATION | Age: 36
End: 2021-12-10

## 2021-12-10 NOTE — CARE COORDINATION
Attempted to reach Laverne Brown today for ED [facility]. Received message stating person you have called is unavailable right now.

## 2022-10-09 ENCOUNTER — APPOINTMENT (OUTPATIENT)
Dept: GENERAL RADIOLOGY | Age: 37
End: 2022-10-09
Payer: MEDICARE

## 2022-10-09 ENCOUNTER — HOSPITAL ENCOUNTER (EMERGENCY)
Age: 37
Discharge: HOME | End: 2022-10-09
Attending: STUDENT IN AN ORGANIZED HEALTH CARE EDUCATION/TRAINING PROGRAM
Payer: MEDICARE

## 2022-10-09 VITALS
SYSTOLIC BLOOD PRESSURE: 107 MMHG | RESPIRATION RATE: 20 BRPM | OXYGEN SATURATION: 99 % | DIASTOLIC BLOOD PRESSURE: 71 MMHG | TEMPERATURE: 98.3 F | HEART RATE: 97 BPM

## 2022-10-09 DIAGNOSIS — M10.9 ACUTE GOUT INVOLVING TOE OF RIGHT FOOT, UNSPECIFIED CAUSE: Primary | ICD-10-CM

## 2022-10-09 PROCEDURE — 6370000000 HC RX 637 (ALT 250 FOR IP): Performed by: STUDENT IN AN ORGANIZED HEALTH CARE EDUCATION/TRAINING PROGRAM

## 2022-10-09 PROCEDURE — 99283 EMERGENCY DEPT VISIT LOW MDM: CPT

## 2022-10-09 PROCEDURE — 73630 X-RAY EXAM OF FOOT: CPT

## 2022-10-09 RX ORDER — COLCHICINE 0.6 MG/1
1.2 TABLET ORAL DAILY
Status: DISCONTINUED | OUTPATIENT
Start: 2022-10-09 | End: 2022-10-09

## 2022-10-09 RX ORDER — ACETAMINOPHEN 500 MG
1000 TABLET ORAL ONCE
Status: COMPLETED | OUTPATIENT
Start: 2022-10-09 | End: 2022-10-09

## 2022-10-09 RX ORDER — COLCHICINE 0.6 MG/1
0.6 TABLET ORAL DAILY
Status: DISCONTINUED | OUTPATIENT
Start: 2022-10-09 | End: 2022-10-09

## 2022-10-09 RX ORDER — COLCHICINE 0.6 MG/1
0.6 CAPSULE ORAL DAILY
Qty: 7 CAPSULE | Refills: 0 | Status: SHIPPED | OUTPATIENT
Start: 2022-10-09 | End: 2022-10-16

## 2022-10-09 RX ORDER — NAPROXEN 250 MG/1
500 TABLET ORAL ONCE
Status: COMPLETED | OUTPATIENT
Start: 2022-10-09 | End: 2022-10-09

## 2022-10-09 RX ORDER — COLCHICINE 0.6 MG/1
1.2 TABLET ORAL ONCE
Status: COMPLETED | OUTPATIENT
Start: 2022-10-09 | End: 2022-10-09

## 2022-10-09 RX ADMIN — NAPROXEN 500 MG: 250 TABLET ORAL at 15:06

## 2022-10-09 RX ADMIN — COLCHICINE 1.2 MG: 0.6 TABLET, FILM COATED ORAL at 15:48

## 2022-10-09 RX ADMIN — ACETAMINOPHEN 1000 MG: 500 TABLET ORAL at 15:06

## 2022-10-09 ASSESSMENT — ENCOUNTER SYMPTOMS
ABDOMINAL PAIN: 0
VOMITING: 0
NAUSEA: 0
TROUBLE SWALLOWING: 0
DIARRHEA: 0
BLOOD IN STOOL: 0
SORE THROAT: 0
COUGH: 0
SHORTNESS OF BREATH: 0
BACK PAIN: 0

## 2022-10-09 ASSESSMENT — PAIN SCALES - GENERAL
PAINLEVEL_OUTOF10: 1
PAINLEVEL_OUTOF10: 7
PAINLEVEL_OUTOF10: 7

## 2022-10-09 ASSESSMENT — PAIN DESCRIPTION - DESCRIPTORS: DESCRIPTORS: ACHING

## 2022-10-09 ASSESSMENT — PAIN DESCRIPTION - ORIENTATION: ORIENTATION: RIGHT

## 2022-10-09 ASSESSMENT — PAIN - FUNCTIONAL ASSESSMENT: PAIN_FUNCTIONAL_ASSESSMENT: 0-10

## 2022-10-09 ASSESSMENT — PAIN DESCRIPTION - LOCATION: LOCATION: ANKLE

## 2022-10-09 NOTE — ED NOTES
Patient to ED for right ankle pain. patient reports she may have stepped wrong on Friday that caused the injury.  Since Friday patient states pain and swelling has increased      Tom Cabrera RN  10/09/22 8080

## 2022-10-09 NOTE — ED PROVIDER NOTES
[address] [facility]          Pt Name: Jaison Braden  MRN: [id number]  [place] 1985  Date of evaluation: 10/9/2022  Treating Resident Physician: Leida Singleton MD  Supervising Physician: Lilian Guerra MD      CHIEF COMPLAINT       Chief Complaint   Patient presents with    Ankle Pain     right     History obtained from the patient. HISTORY OF PRESENT ILLNESS    HPI  Jaison Braden is a 30 y.o. female who presents to the emergency department for evaluation of toe pain. Patient reports over the last 2 days she developed swelling over the proximal MTP joint. Worse with movement. Patient states that she is a  and thinks that this might be attributed to her repeatedly hitting the gas pedal.  Denies any fevers or chills. Denies rash. The patient has no other acute complaints at this time. REVIEW OF SYSTEMS   Review of Systems   Constitutional:  Negative for chills, diaphoresis, fatigue and fever. HENT:  Negative for sore throat and trouble swallowing. Eyes:  Negative for visual disturbance. Respiratory:  Negative for cough and shortness of breath. Cardiovascular:  Negative for chest pain, palpitations and leg swelling. Gastrointestinal:  Negative for abdominal pain, blood in stool, diarrhea, nausea and vomiting. Genitourinary:  Negative for dysuria, hematuria and urgency. Musculoskeletal:  Negative for arthralgias, back pain, myalgias and neck pain. Skin:  Negative for rash. Neurological:  Negative for seizures, syncope, weakness, numbness and headaches.        PAST MEDICAL AND SURGICAL HISTORY     Past Medical History:   Diagnosis Date    Complication of anesthesia     headache after last spinal    Postpartum depression     history of depression     Past Surgical History:   Procedure Laterality Date    ARM SURGERY       SECTION  ,      SECTION Bilateral 6/15/2021    REPEAT  SECTION WITH BILATBERAL SALPINECTOMY performed by Ariadna Paul MD at [facility]    TONSILLECTOMY      WRIST SURGERY  age 10         MEDICATIONS     Current Facility-Administered Medications:     colchicine (COLCRYS) tablet 1.2 mg, 1.2 mg, Oral, Once, Noris Martin MD    Current Outpatient Medications:     colchicine (MITIGARE) 0.6 MG capsule, Take 1 capsule by mouth daily for 7 days, Disp: 7 capsule, Rfl: 0    docusate sodium (COLACE) 100 MG capsule, Take 100 mg by mouth 2 times daily, Disp: , Rfl:     Prenatal MV-Min-Fe Fum-FA-DHA (PRENATAL 1 PO), Take by mouth, Disp: , Rfl:     acetaminophen (TYLENOL) 500 MG tablet, Take 1,000 mg by mouth every 6 hours as needed for Pain, Disp: , Rfl:     etodolac (LODINE) 300 MG capsule, Take 1 capsule by mouth every 8 hours, Disp: 30 capsule, Rfl: 0    cyclobenzaprine (FLEXERIL) 10 MG tablet, Take 1 tablet by mouth 3 times daily as needed for Muscle spasms, Disp: 20 tablet, Rfl: 0      SOCIAL HISTORY     Social History     Social History Narrative    Not on file     Social History     Tobacco Use    Smoking status: Every Day     Packs/day: 0.50     Types: Cigarettes, E-Cigarettes    Smokeless tobacco: Never   Vaping Use    Vaping Use: Some days   Substance Use Topics    Alcohol use: Yes     Comment: occasional    Drug use: Not Currently     Types: Marijuana Deadra Maximino)     Comment: not smoked since beginning of pregnancy         ALLERGIES     Allergies   Allergen Reactions    Erythromycin Nausea And Vomiting         FAMILY HISTORY     Family History   Problem Relation Age of Onset    Asthma Mother     Heart Disease Mother     High Blood Pressure Mother     Asthma Father     Heart Disease Father     High Blood Pressure Father     Stroke Father     Arthritis Maternal Grandmother     Diabetes Maternal Grandmother     Heart Disease Maternal Grandmother     High Blood Pressure Maternal Grandmother     Arthritis Maternal Grandfather     Diabetes Maternal Grandfather     Heart Disease Maternal Grandfather     High Blood Pressure Maternal Grandfather          PREVIOUS RECORDS   Previous records reviewed: I reviewed the patient's past medical records including relevant labs, imaging and procedures. PHYSICAL EXAM     ED Triage Vitals   BP Temp Temp src Heart Rate Resp SpO2 Height Weight   10/09/22 1435 10/09/22 1434 -- 10/09/22 1434 10/09/22 1434 10/09/22 1434 -- --   107/71 98.3 °F (36.8 °C)  97 20 99 %       Initial vital signs and nursing assessment reviewed and normal. There is no height or weight on file to calculate BMI. Pulsoximetry is normal per my interpretation. Additional Vital Signs:  Vitals:    10/09/22 1435   BP: 107/71   Pulse:    Resp:    Temp:    SpO2:        Physical Exam  Vitals and nursing note reviewed. Constitutional:       Appearance: Normal appearance. HENT:      Head: Normocephalic and atraumatic. Right Ear: Tympanic membrane normal.      Left Ear: Tympanic membrane normal.      Nose: Nose normal.      Mouth/Throat:      Mouth: Mucous membranes are moist.      Pharynx: Oropharynx is clear. No oropharyngeal exudate. Eyes:      General: No scleral icterus. Extraocular Movements: Extraocular movements intact. Conjunctiva/sclera: Conjunctivae normal.      Pupils: Pupils are equal, round, and reactive to light. Cardiovascular:      Rate and Rhythm: Normal rate and regular rhythm. Pulses: Normal pulses. Heart sounds: Normal heart sounds. No murmur heard. No friction rub. No gallop. Pulmonary:      Effort: Pulmonary effort is normal. No respiratory distress. Breath sounds: Normal breath sounds. Abdominal:      Palpations: Abdomen is soft. Tenderness: There is no abdominal tenderness. There is no right CVA tenderness, left CVA tenderness, guarding or rebound. Musculoskeletal:         General: No swelling or tenderness. Normal range of motion. Cervical back: Normal range of motion and neck supple.       Right lower leg: No edema. Left lower leg: No edema. Comments: Newness over first MTP over the right toe. Pain with movement. Joint feels warm   Skin:     General: Skin is warm and dry. Capillary Refill: Capillary refill takes less than 2 seconds. Neurological:      General: No focal deficit present. Mental Status: She is alert and oriented to person, place, and time. Cranial Nerves: No cranial nerve deficit. Motor: No weakness. ED RESULTS   Laboratory results:  Labs Reviewed - No data to display    Radiologic studies results:  XR FOOT RIGHT (MIN 3 VIEWS)   Final Result      No fracture or dislocation. Final report electronically signed by Dr. Иван Gudino on 10/9/2022 3:12 PM          ED Medications administered this visit:   Medications   colchicine (COLCRYS) tablet 1.2 mg (has no administration in time range)   acetaminophen (TYLENOL) tablet 1,000 mg (1,000 mg Oral Given 10/9/22 1506)   naproxen (NAPROSYN) tablet 500 mg (500 mg Oral Given 10/9/22 1506)         ED COURSE     ED Course as of 10/09/22 1535   Sun Oct 09, 2022   1516 XR FOOT RIGHT (MIN 3 VIEWS)  IMPRESSION:     No fracture or dislocation. [TM]      ED Course User Index  [TM] Ben Ortiz MD           MEDICAL DECISION MAKING   Given the patient's above chief complaint and findings on history and physical examination, I thought it was appropriate to consider the following emergency medical conditions:  Gout, septic arthritis, osteoarthritis, fracture, dislocation, cellulitis  Although some of these diagnoses are unlikely they were considered in my medical decision making. No overlying erythema to suggest cellulitis. First of DP is exquisitely tender. We will treat for gout. No systemic symptoms. No fracture on x-ray. Strict return precautions and follow up instructions were discussed with the patient prior to discharge, with which the patient agrees.     MEDICATION CHANGES     New Prescriptions COLCHICINE (MITIGARE) 0.6 MG CAPSULE    Take 1 capsule by mouth daily for 7 days         FINAL DISPOSITION     Final diagnoses:   Acute gout involving toe of right foot, unspecified cause     Condition: condition: stable  Dispo: Discharge to home      This transcription was electronically signed. Parts of this transcriptions may have been dictated by use of voice recognition software and electronically transcribed, and parts may have been transcribed with the assistance of an ED scribe. The transcription may contain errors not detected in proofreading. Please refer to my supervising physician's documentation if my documentation differs.     Electronically Signed: Yodit Combs MD, 10/09/22, 3:35 PM         Bernabe Matta MD  Resident  10/09/22 5654

## 2022-10-14 ENCOUNTER — HOSPITAL ENCOUNTER (OUTPATIENT)
Age: 37
Setting detail: SPECIMEN
Discharge: HOME | End: 2022-10-14

## 2022-10-14 LAB
ABSOLUTE EOS #: 0.11 K/UL (ref 0–0.44)
ABSOLUTE IMMATURE GRANULOCYTE: <0.03 K/UL (ref 0–0.3)
ABSOLUTE LYMPH #: 1.94 K/UL (ref 1.1–3.7)
ABSOLUTE MONO #: 0.44 K/UL (ref 0.1–1.2)
ALBUMIN SERPL-MCNC: 4.6 G/DL (ref 3.5–5.2)
ALBUMIN/GLOBULIN RATIO: 1.9 (ref 1–2.5)
ALP BLD-CCNC: 46 U/L (ref 35–104)
ALT SERPL-CCNC: 11 U/L (ref 5–33)
ANION GAP SERPL CALCULATED.3IONS-SCNC: 13 MMOL/L (ref 9–17)
AST SERPL-CCNC: 16 U/L
BASOPHILS # BLD: 1 % (ref 0–2)
BASOPHILS ABSOLUTE: 0.04 K/UL (ref 0–0.2)
BILIRUB SERPL-MCNC: 0.6 MG/DL (ref 0.3–1.2)
BUN BLDV-MCNC: 8 MG/DL (ref 6–20)
C-REACTIVE PROTEIN: <3 MG/L (ref 0–5)
CALCIUM SERPL-MCNC: 9 MG/DL (ref 8.6–10.4)
CHLORIDE BLD-SCNC: 109 MMOL/L (ref 98–107)
CHOLESTEROL/HDL RATIO: 3.1
CHOLESTEROL: 110 MG/DL
CO2: 21 MMOL/L (ref 20–31)
CREAT SERPL-MCNC: 0.63 MG/DL (ref 0.5–0.9)
EOSINOPHILS RELATIVE PERCENT: 2 % (ref 1–4)
GFR SERPL CREATININE-BSD FRML MDRD: >60 ML/MIN/1.73M2
GLUCOSE BLD-MCNC: 82 MG/DL (ref 70–99)
HCT VFR BLD CALC: 42.5 % (ref 36.3–47.1)
HDLC SERPL-MCNC: 35 MG/DL
HEMOGLOBIN: 13.7 G/DL (ref 11.9–15.1)
IMMATURE GRANULOCYTES: 0 %
LDL CHOLESTEROL: 62 MG/DL (ref 0–130)
LYMPHOCYTES # BLD: 26 % (ref 24–43)
MCH RBC QN AUTO: 32.5 PG (ref 25.2–33.5)
MCHC RBC AUTO-ENTMCNC: 32.2 G/DL (ref 28.4–34.8)
MCV RBC AUTO: 101 FL (ref 82.6–102.9)
MONOCYTES # BLD: 6 % (ref 3–12)
NRBC AUTOMATED: 0 PER 100 WBC
PDW BLD-RTO: 12.2 % (ref 11.8–14.4)
PLATELET # BLD: 230 K/UL (ref 138–453)
PMV BLD AUTO: 11.4 FL (ref 8.1–13.5)
POTASSIUM SERPL-SCNC: 4.2 MMOL/L (ref 3.7–5.3)
RBC # BLD: 4.21 M/UL (ref 3.95–5.11)
RHEUMATOID FACTOR: <10 IU/ML
SEDIMENTATION RATE, ERYTHROCYTE: 8 MM/HR (ref 0–20)
SEG NEUTROPHILS: 65 % (ref 36–65)
SEGMENTED NEUTROPHILS ABSOLUTE COUNT: 4.81 K/UL (ref 1.5–8.1)
SODIUM BLD-SCNC: 143 MMOL/L (ref 135–144)
T3 TOTAL: 102 NG/DL (ref 60–181)
TOTAL PROTEIN: 7 G/DL (ref 6.4–8.3)
TRIGL SERPL-MCNC: 63 MG/DL
TSH SERPL DL<=0.05 MIU/L-ACNC: 0.96 UIU/ML (ref 0.3–5)
URIC ACID: 3.1 MG/DL (ref 2.4–5.7)
WBC # BLD: 7.4 K/UL (ref 3.5–11.3)

## 2022-10-15 LAB
ANTI DNA DOUBLE STRANDED: 3.3 IU/ML
ANTI-NUCLEAR ANTIBODY (ANA): NEGATIVE
CCP IGG ANTIBODIES: 0.5 U/ML (ref 0–7)
ENA ANTIBODIES SCREEN: 0.3 U/ML

## 2023-04-26 ENCOUNTER — TELEPHONE (OUTPATIENT)
Dept: ADMINISTRATIVE | Facility: CLINIC | Age: 38
End: 2023-04-26

## 2023-04-26 ENCOUNTER — TELEPHONE (OUTPATIENT)
Dept: ENT CLINIC | Age: 38
End: 2023-04-26

## 2023-04-26 ENCOUNTER — HOSPITAL ENCOUNTER (EMERGENCY)
Age: 38
Discharge: HOME | End: 2023-04-26
Attending: EMERGENCY MEDICINE
Payer: MEDICAID

## 2023-04-26 VITALS
HEIGHT: 62 IN | DIASTOLIC BLOOD PRESSURE: 78 MMHG | HEART RATE: 77 BPM | TEMPERATURE: 97.8 F | OXYGEN SATURATION: 99 % | RESPIRATION RATE: 13 BRPM | SYSTOLIC BLOOD PRESSURE: 91 MMHG | BODY MASS INDEX: 23.92 KG/M2 | WEIGHT: 130 LBS

## 2023-04-26 DIAGNOSIS — R60.0 FACIAL EDEMA: Primary | ICD-10-CM

## 2023-04-26 LAB
AMPHETAMINES UR QL SCN: NEGATIVE
ANION GAP SERPL CALC-SCNC: 10 MEQ/L (ref 8–16)
BARBITURATES UR QL SCN: NEGATIVE
BASOPHILS ABSOLUTE: 0.1 THOU/MM3 (ref 0–0.1)
BASOPHILS NFR BLD AUTO: 0.7 %
BENZODIAZ UR QL SCN: NEGATIVE
BUN SERPL-MCNC: 8 MG/DL (ref 7–22)
BZE UR QL SCN: NEGATIVE
CALCIUM SERPL-MCNC: 9.1 MG/DL (ref 8.5–10.5)
CANNABINOIDS UR QL SCN: POSITIVE
CHLORIDE SERPL-SCNC: 107 MEQ/L (ref 98–111)
CO2 SERPL-SCNC: 27 MEQ/L (ref 23–33)
CREAT SERPL-MCNC: 0.6 MG/DL (ref 0.4–1.2)
DEPRECATED RDW RBC AUTO: 45.2 FL (ref 35–45)
EOSINOPHIL NFR BLD AUTO: 2.5 %
EOSINOPHILS ABSOLUTE: 0.3 THOU/MM3 (ref 0–0.4)
ERYTHROCYTE [DISTWIDTH] IN BLOOD BY AUTOMATED COUNT: 12.1 % (ref 11.5–14.5)
FENTANYL: NEGATIVE
GFR SERPL CREATININE-BSD FRML MDRD: > 60 ML/MIN/1.73M2
GLUCOSE SERPL-MCNC: 89 MG/DL (ref 70–108)
HCT VFR BLD AUTO: 46.2 % (ref 37–47)
HGB BLD-MCNC: 15.2 GM/DL (ref 12–16)
IMM GRANULOCYTES # BLD AUTO: 0.03 THOU/MM3 (ref 0–0.07)
IMM GRANULOCYTES NFR BLD AUTO: 0.3 %
LYMPHOCYTES ABSOLUTE: 3.8 THOU/MM3 (ref 1–4.8)
LYMPHOCYTES NFR BLD AUTO: 36.9 %
MCH RBC QN AUTO: 33.2 PG (ref 26–33)
MCHC RBC AUTO-ENTMCNC: 32.9 GM/DL (ref 32.2–35.5)
MCV RBC AUTO: 100.9 FL (ref 81–99)
MONOCYTES ABSOLUTE: 0.7 THOU/MM3 (ref 0.4–1.3)
MONOCYTES NFR BLD AUTO: 6.9 %
NEUTROPHILS NFR BLD AUTO: 52.7 %
NRBC BLD AUTO-RTO: 0 /100 WBC
OPIATES UR QL SCN: NEGATIVE
OSMOLALITY SERPL CALC.SUM OF ELEC: 284.6 MOSMOL/KG (ref 275–300)
OXYCODONE: NEGATIVE
PCP UR QL SCN: NEGATIVE
PLATELET # BLD AUTO: 289 THOU/MM3 (ref 130–400)
PMV BLD AUTO: 10.5 FL (ref 9.4–12.4)
POTASSIUM SERPL-SCNC: 3.5 MEQ/L (ref 3.5–5.2)
RBC # BLD AUTO: 4.58 MILL/MM3 (ref 4.2–5.4)
SEGMENTED NEUTROPHILS ABSOLUTE COUNT: 5.4 THOU/MM3 (ref 1.8–7.7)
SODIUM SERPL-SCNC: 144 MEQ/L (ref 135–145)
WBC # BLD AUTO: 10.2 THOU/MM3 (ref 4.8–10.8)

## 2023-04-26 PROCEDURE — 6360000002 HC RX W HCPCS: Performed by: EMERGENCY MEDICINE

## 2023-04-26 PROCEDURE — 6370000000 HC RX 637 (ALT 250 FOR IP): Performed by: EMERGENCY MEDICINE

## 2023-04-26 PROCEDURE — 36415 COLL VENOUS BLD VENIPUNCTURE: CPT

## 2023-04-26 PROCEDURE — 80048 BASIC METABOLIC PNL TOTAL CA: CPT

## 2023-04-26 PROCEDURE — 96374 THER/PROPH/DIAG INJ IV PUSH: CPT

## 2023-04-26 PROCEDURE — 99284 EMERGENCY DEPT VISIT MOD MDM: CPT

## 2023-04-26 PROCEDURE — 80307 DRUG TEST PRSMV CHEM ANLYZR: CPT

## 2023-04-26 PROCEDURE — 2580000003 HC RX 258: Performed by: EMERGENCY MEDICINE

## 2023-04-26 PROCEDURE — 85025 COMPLETE CBC W/AUTO DIFF WBC: CPT

## 2023-04-26 RX ORDER — PREDNISONE 20 MG/1
60 TABLET ORAL ONCE
Status: COMPLETED | OUTPATIENT
Start: 2023-04-26 | End: 2023-04-26

## 2023-04-26 RX ORDER — PREDNISONE 20 MG/1
20 TABLET ORAL DAILY
Qty: 5 TABLET | Refills: 0 | Status: SHIPPED | OUTPATIENT
Start: 2023-04-26 | End: 2023-05-01

## 2023-04-26 RX ORDER — DIPHENHYDRAMINE HYDROCHLORIDE 50 MG/ML
25 INJECTION INTRAMUSCULAR; INTRAVENOUS ONCE
Status: COMPLETED | OUTPATIENT
Start: 2023-04-26 | End: 2023-04-26

## 2023-04-26 RX ORDER — 0.9 % SODIUM CHLORIDE 0.9 %
1000 INTRAVENOUS SOLUTION INTRAVENOUS ONCE
Status: COMPLETED | OUTPATIENT
Start: 2023-04-26 | End: 2023-04-26

## 2023-04-26 RX ADMIN — DIPHENHYDRAMINE HYDROCHLORIDE 25 MG: 50 INJECTION, SOLUTION INTRAMUSCULAR; INTRAVENOUS at 02:17

## 2023-04-26 RX ADMIN — PREDNISONE 60 MG: 20 TABLET ORAL at 02:17

## 2023-04-26 RX ADMIN — SODIUM CHLORIDE 1000 ML: 9 INJECTION, SOLUTION INTRAVENOUS at 02:17

## 2023-04-26 ASSESSMENT — PAIN - FUNCTIONAL ASSESSMENT
PAIN_FUNCTIONAL_ASSESSMENT: NONE - DENIES PAIN

## 2023-04-26 NOTE — ED NOTES
Pt resting quietly in bed at this time. Pt states that she feels \"about the same. \" Pt updated on POC, verbalized understanding.       Lio Mullins RN  04/26/23 1686

## 2023-04-26 NOTE — TELEPHONE ENCOUNTER
Called pt and informed her no referral was placed by ED and provider Judy Foreman cannot have use schedule appointment for new patient until this is done. Informed pt that she needs to call PCP to get referral sent to us. Pt states she does not have a PCP. Informed pt she needs to get with one to be established and for referral to be placed. Pt verbalized understanding and thanked me.

## 2023-04-26 NOTE — ED PROVIDER NOTES
[facility] EMERGENCY DEPT      CHIEF COMPLAINT       Chief Complaint   Patient presents with    Facial Swelling       Nurses Notes reviewed and I agree except as noted in the HPI. HISTORY OF PRESENT ILLNESS    Anne Rico is a 41 y.o. female who presents with complaint of facial swelling, patient said that she been having on and off facial swelling for the past 2 days, initially thought she was bitten by a bug, not sure why she is having facial swelling. She has no trouble swallowing or breathing. No history of angioedema, does not take any medications at home. No voice change, can handle oral secretions without any trouble. Onset: Acute  Duration: Intermittently for the past 2 days  Timing: Intermittent  Location of Pain: No pain  Intesity/severity: Mild  Modifying Factors: Unknown  Relieved by;  Previous Episodes; Tx Before arrival: Took Benadryl with some relief  REVIEW OF SYSTEMS         PAST MEDICAL HISTORY    has a past medical history of Complication of anesthesia and Postpartum depression. SURGICAL HISTORY      has a past surgical history that includes  section (, ); Wrist surgery (age 10); Tonsillectomy; Arm Surgery; and  section (Bilateral, 6/15/2021).     CURRENT MEDICATIONS       Discharge Medication List as of 2023  3:48 AM        CONTINUE these medications which have NOT CHANGED    Details   colchicine (MITIGARE) 0.6 MG capsule Take 1 capsule by mouth daily for 7 days, Disp-7 capsule, R-0Normal      docusate sodium (COLACE) 100 MG capsule Take 100 mg by mouth 2 times dailyHistorical Med      Prenatal MV-Min-Fe Fum-FA-DHA (PRENATAL 1 PO) Take by mouthHistorical Med      acetaminophen (TYLENOL) 500 MG tablet Take 1,000 mg by mouth every 6 hours as needed for PainHistorical Med      etodolac (LODINE) 300 MG capsule Take 1 capsule by mouth every 8 hours, Disp-30 capsule, R-0Print      cyclobenzaprine (FLEXERIL) 10 MG tablet Take 1 tablet by mouth 3 times daily

## 2023-04-26 NOTE — TELEPHONE ENCOUNTER
Constance Christine there is not a referral.  Call the patient and tell her to get a referral.  THEN an appt can made. But without a referral do not schedule.   She must have this first

## 2023-04-26 NOTE — ED TRIAGE NOTES
Pt comes to ED with c/o facial swelling. Pt reports that she noticed swelling around her teeth yesterday morning. Pt reports that she took a vicodin for a potential tooth ache. Pt states that the swelling in her face began later in the day. Pt reports that she took benadryl multiple times yesterday with her last dose at 2000 yesterday. Pt denies taking any new medications or new foods. Pt denies pain at this time. Pt denies SOB.

## 2023-04-26 NOTE — TELEPHONE ENCOUNTER
Patient was seen in the ER  last night. She said her face and lips  are all swollen. She said she looks like mary the fish. They gave her steroids in the ER and told her to call us today. She said the steroids are not helping. She said she is not sure what she is allergic to and they said if it is something in the house it will not go away until she finds out what she is allergic to. She has not ate anything different. She said her throat feels swollen and it is hard to swallow. She has taken benadryl for it and it made her sleep all day. She said her breathing is fine right now but it goes back in forth on having trouble breathing. Patient has not been seen by us at all. They referred her to us to been seen since she is having a reaction to something but not sure what.

## 2023-04-26 NOTE — TELEPHONE ENCOUNTER
Received referral from ED. Pt scheduled. Encouraged pt to get established with PCP until seeing provider John Carroll. Pt verbalized understanding and thanked me.

## 2023-04-26 NOTE — TELEPHONE ENCOUNTER
Hello - patient called the [facility] to get an appt with Allergy stating that she was at the ED. I transferred patient to Allergy office to speak with Kiarra Contreras, as she was calling due to a current allergic reaction.

## 2023-05-11 ENCOUNTER — NURSE ONLY (OUTPATIENT)
Dept: LAB | Age: 38
End: 2023-05-11

## 2023-05-11 ENCOUNTER — OFFICE VISIT (OUTPATIENT)
Dept: ALLERGY | Age: 38
End: 2023-05-11
Payer: MEDICAID

## 2023-05-11 VITALS
WEIGHT: 125.2 LBS | BODY MASS INDEX: 23.04 KG/M2 | RESPIRATION RATE: 18 BRPM | OXYGEN SATURATION: 98 % | DIASTOLIC BLOOD PRESSURE: 64 MMHG | SYSTOLIC BLOOD PRESSURE: 116 MMHG | HEART RATE: 91 BPM | HEIGHT: 62 IN | TEMPERATURE: 97 F

## 2023-05-11 DIAGNOSIS — T78.3XXA ANGIOEDEMA, INITIAL ENCOUNTER: ICD-10-CM

## 2023-05-11 DIAGNOSIS — T78.3XXA ANGIOEDEMA, INITIAL ENCOUNTER: Primary | ICD-10-CM

## 2023-05-11 DIAGNOSIS — T78.2XXA ANAPHYLAXIS, INITIAL ENCOUNTER: ICD-10-CM

## 2023-05-11 LAB
ALBUMIN SERPL BCG-MCNC: 5.2 G/DL (ref 3.5–5.1)
ALP SERPL-CCNC: 50 U/L (ref 38–126)
ALT SERPL W/O P-5'-P-CCNC: 17 U/L (ref 11–66)
ANION GAP SERPL CALC-SCNC: 15 MEQ/L (ref 8–16)
AST SERPL-CCNC: 17 U/L (ref 5–40)
BASOPHILS ABSOLUTE: 0.1 THOU/MM3 (ref 0–0.1)
BASOPHILS NFR BLD AUTO: 0.7 %
BILIRUB SERPL-MCNC: 0.7 MG/DL (ref 0.3–1.2)
BUN SERPL-MCNC: 8 MG/DL (ref 7–22)
CALCIUM SERPL-MCNC: 10 MG/DL (ref 8.5–10.5)
CHLORIDE SERPL-SCNC: 103 MEQ/L (ref 98–111)
CO2 SERPL-SCNC: 22 MEQ/L (ref 23–33)
CREAT SERPL-MCNC: 0.6 MG/DL (ref 0.4–1.2)
CREAT UR-MCNC: 200.7 MG/DL
CRP SERPL-MCNC: < 0.3 MG/DL (ref 0–1)
DEPRECATED RDW RBC AUTO: 47.7 FL (ref 35–45)
EOSINOPHIL NFR BLD AUTO: 0.9 %
EOSINOPHILS ABSOLUTE: 0.1 THOU/MM3 (ref 0–0.4)
ERYTHROCYTE [DISTWIDTH] IN BLOOD BY AUTOMATED COUNT: 12.1 % (ref 11.5–14.5)
ERYTHROCYTE [SEDIMENTATION RATE] IN BLOOD BY WESTERGREN METHOD: 7 MM/HR (ref 0–20)
GFR SERPL CREATININE-BSD FRML MDRD: > 60 ML/MIN/1.73M2
GLUCOSE SERPL-MCNC: 88 MG/DL (ref 70–108)
HCT VFR BLD AUTO: 48 % (ref 37–47)
HGB BLD-MCNC: 14.9 GM/DL (ref 12–16)
IMM GRANULOCYTES # BLD AUTO: 0.02 THOU/MM3 (ref 0–0.07)
IMM GRANULOCYTES NFR BLD AUTO: 0.2 %
LYMPHOCYTES ABSOLUTE: 3.3 THOU/MM3 (ref 1–4.8)
LYMPHOCYTES NFR BLD AUTO: 36.6 %
MCH RBC QN AUTO: 32.9 PG (ref 26–33)
MCHC RBC AUTO-ENTMCNC: 31 GM/DL (ref 32.2–35.5)
MCV RBC AUTO: 106 FL (ref 81–99)
MICROALBUMIN UR-MCNC: 5.99 MG/DL
MICROALBUMIN/CREAT RATIO PNL UR: 30 MG/G (ref 0–30)
MONOCYTES ABSOLUTE: 0.4 THOU/MM3 (ref 0.4–1.3)
MONOCYTES NFR BLD AUTO: 4.2 %
NEUTROPHILS NFR BLD AUTO: 57.4 %
NRBC BLD AUTO-RTO: 0 /100 WBC
PLATELET # BLD AUTO: 240 THOU/MM3 (ref 130–400)
PMV BLD AUTO: 11.2 FL (ref 9.4–12.4)
POTASSIUM SERPL-SCNC: 3.9 MEQ/L (ref 3.5–5.2)
PROT SERPL-MCNC: 7.6 G/DL (ref 6.1–8)
RBC # BLD AUTO: 4.53 MILL/MM3 (ref 4.2–5.4)
RHEUMATOID FACT SERPL-ACNC: < 10 IU/ML (ref 0–13)
SEGMENTED NEUTROPHILS ABSOLUTE COUNT: 5.2 THOU/MM3 (ref 1.8–7.7)
SODIUM SERPL-SCNC: 140 MEQ/L (ref 135–145)
TSH SERPL DL<=0.005 MIU/L-ACNC: 0.89 UIU/ML (ref 0.4–4.2)
WBC # BLD AUTO: 9 THOU/MM3 (ref 4.8–10.8)

## 2023-05-11 PROCEDURE — 99204 OFFICE O/P NEW MOD 45 MIN: CPT | Performed by: NURSE PRACTITIONER

## 2023-05-11 RX ORDER — EPINEPHRINE 0.3 MG/.3ML
0.3 INJECTION SUBCUTANEOUS ONCE
Qty: 0.3 ML | Refills: 0 | Status: SHIPPED | OUTPATIENT
Start: 2023-05-11 | End: 2023-05-11

## 2023-05-11 RX ORDER — PREDNISONE 20 MG/1
20 TABLET ORAL 2 TIMES DAILY
Qty: 10 TABLET | Refills: 0 | Status: SHIPPED | OUTPATIENT
Start: 2023-05-11 | End: 2023-05-16

## 2023-05-11 RX ORDER — FAMOTIDINE 20 MG/1
20 TABLET, FILM COATED ORAL 2 TIMES DAILY
Qty: 60 TABLET | Refills: 3 | COMMUNITY
Start: 2023-05-11

## 2023-05-11 RX ORDER — LORATADINE 10 MG/1
10 TABLET ORAL DAILY
Qty: 30 TABLET | Refills: 2 | COMMUNITY
Start: 2023-05-11

## 2023-05-11 ASSESSMENT — ENCOUNTER SYMPTOMS
WHEEZING: 0
DIARRHEA: 0
ABDOMINAL PAIN: 0
CHOKING: 0
SINUS PRESSURE: 0
NAUSEA: 0
STRIDOR: 0
RHINORRHEA: 0
VOICE CHANGE: 1
FACIAL SWELLING: 1
CHEST TIGHTNESS: 0
SHORTNESS OF BREATH: 0
APNEA: 0
COLOR CHANGE: 0
COUGH: 0
SORE THROAT: 0
TROUBLE SWALLOWING: 1
VOMITING: 0

## 2023-05-11 NOTE — PROGRESS NOTES
Allergy & Asthma   [address]  [facility], [address]  [address] [facility]  Fax: [phone]          Chief Complaint:   Chief Complaint   Patient presents with    New Patient     Facial edema and andioedema. Pt does not know the cause. Pt does not have an epi pen. Referred by Emergency Room doctor. HISTORY OF PRESENT ILLNESS: NEW PATIENT TO PRACTICE   41year old female here today to establish as a new patient. Patient had been seen formally in the emergency room for angioedema. The etiology of the angioedema is unknown. Patient was drug tested found to have marijuana in her system. Patient states that she gets the marijuana from the same person over and over and she did smoke some marijuana and cigarettes from the same batch and did not have any problems so she does not suspect that would be that. Patient has had no new dyes soaps perfumes medication. She has not ingested any chemicals. Patient states that she did take Vicodin whenever she did have angioedema hoping that would help reduce some of the swelling. She also tried Benadryl which did not help. She states the swelling continued to progress and get worse. She finally ended up going to the emergency room. She reports that she does work at [employer] that has a lot of chemicals but there was no new chemicals that she is aware of. 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 She does not think she was exposed to anything. Patient does have poor dental hygiene. However she states that she has never had any problems like this before. She does not have any current abscesses of her teeth although she does have many dental caries. Severity of the angioedema was severe. Patient felt like her throat was a little bit tight. Review of Systems:  Review of Systems   Constitutional:  Negative for activity change, appetite change, chills, diaphoresis, fatigue, fever and unexpected weight change.    HENT:  Positive for facial swelling, trouble

## 2023-05-11 NOTE — PATIENT INSTRUCTIONS
Patient Education      epinephrine injection  Pronunciation:  ZACHARY peters  Brand:  Adrenalin, Auvi-Q, Epinephrinesnap-EMS, Epinephrinesnap-V, EpiPen 2-Mauricio, EpiPen JR 2-Mauricio, EPIsnap, Symjepi  What is the most important information I should know about epinephrine injection? Seek emergency medical attention after any use of epinephrine to treat a severe allergic reaction. After the injection you will need to receive further treatment and observation. What is epinephrine injection? Epinephrine injection is used to treat severe allergic reactions (anaphylaxis) to insect stings or bites, foods, drugs, and other allergens. Epinephrine auto-injectors may be kept on hand for self-injection by a person with a history of severe allergic reaction. Epinephrine is also used to treat exercise-induced anaphylaxis, or to treat low blood pressure that is caused by septic shock. Epinephrine injection may also be used for purposes not listed in this medication guide. What should I discuss with my healthcare provider before using epinephrine injection? Before using epinephrine, tell your doctor if any past use of this medicine caused an allergic reaction to get worse. Tell your doctor if you have ever had:  heart disease or high blood pressure;  asthma;  Parkinson's disease;  depression or mental illness;  a thyroid disorder; or  diabetes. Having an allergic reaction while pregnant or nursing could harm both mother and baby. You may need to use epinephrine during pregnancy or while you are breastfeeding. Seek emergency medical attention right away after using the injection. If possible during an emergency, tell your medical caregivers if you are pregnant or breastfeeding. How should I use epinephrine injection? Follow all directions on your prescription label and read all medication guides or instruction sheets. Use the medicine exactly as directed. Epinephrine is injected into the skin or muscle of your outer thigh.  In

## 2023-05-12 LAB
C3C SERPL-MCNC: 116 MG/DL (ref 90–180)
C4 SERPL-MCNC: 25 MG/DL (ref 10–40)
IGA SERPL-MCNC: 110 MG/DL (ref 70–400)
IGG SERPL-MCNC: 745 MG/DL (ref 700–1600)
IGM SERPL-MCNC: 117 MG/DL (ref 40–230)

## 2023-05-14 LAB
C1INH SERPL-MCNC: 34 MG/DL (ref 21–38)
GAMMA INTERFERON BACKGROUND BLD IA-ACNC: 0.01 IU/ML
M TB IFN-G BLD-IMP: NEGATIVE
M TB IFN-G CD4+ BCKGRND COR BLD-ACNC: 0 IU/ML (ref 0–0.34)
M TB IFN-G CD4+CD8+ BCKGRND COR BLD-ACNC: 0 IU/ML (ref 0–0.34)
MITOGEN IGNF BCKGRD COR BLD-ACNC: > 10 IU/ML
MYELOPEROXIDASE AB SER-ACNC: 0 AU/ML (ref 0–19)
PROTEINASE3 AB SER-ACNC: 0 AU/ML (ref 0–19)
TRYPTASE SERPL-MCNC: NORMAL NG/ML

## 2023-05-15 LAB
A ALTERNATA IGE QN: < 0.1 KU/L
A FUMIGATUS IGE QN: < 0.1 KU/L
BARLEY IGE QN: < 0.1 KU/L
BEEF IGE QN: < 0.1 KU/L
BERMUDA GRASS IGE QN: < 0.1 KU/L
BOXELDER IGE QN: < 0.1 KU/L
C HERBARUM IGE QN: < 0.1 KU/L
CABBAGE IGE QN: < 0.1 KU/L
CALIF WALNUT POLN IGE QN: < 0.1 KU/L
CARROT IGE QN: < 0.1 KU/L
CAT DANDER IGE QN: < 0.1 KU/L
CHICKEN SERUM PROT IGE QN: < 0.1 KU/L
CMN PIGWEED IGE QN: < 0.1 KU/L
CODFISH IGE QN: < 0.1 KU/L
COMMON RAGWEED IGE QN: < 0.1 KU/L
CORN IGE QN: < 0.1 KU/L
COTTONWOOD IGE QN: < 0.1 KU/L
COW MILK IGE QN: < 0.1 KU/L
CRAB IGE QN: < 0.1 KU/L
D FARINAE IGE QN: < 0.1 KU/L
D PTERONYSS IGE QN: < 0.1 KU/L
DEPRECATED MISC ALLERGEN IGE RAST QL: NORMAL
DOG DANDER IGE QN: < 0.1 KU/L
EGG WHITE IGE QN: < 0.1 KU/L
GRAPE IGE QN: < 0.1 KU/L
HISTONE IGG SER IA-ACNC: NORMAL
IGE SERPL-ACNC: 25 KU/L
LETTUCE IGE QN: < 0.1 KU/L
LONDON PLANE IGE QN: < 0.1 KU/L
M RACEMOSUS IGE QN: < 0.1 KU/L
MOUSE EPITH IGE QN: < 0.1 KU/L
MT JUNIPER IGE QN: < 0.1 KU/L
OAT IGE QN: < 0.1 KU/L
ORANGE IGE QN: < 0.1 KU/L
P NOTATUM IGE QN: < 0.1 KU/L
PAPRIKA IGE QN: < 0.1 KU/L
PECAN/HICK TREE IGE QN: < 0.1 KU/L
PORK IGE QN: < 0.1 KU/L
POTATO IGE QN: < 0.1 KU/L
RICE IGE QN: < 0.1 KU/L
ROACH IGE QN: < 0.1 KU/L
RYE IGE QN: < 0.1 KU/L
SALTWORT IGE QN: < 0.1 KU/L
SHEEP SORREL IGE QN: < 0.1 KU/L
SHRIMP IGE QN: < 0.1 KU/L
SILVER BIRCH IGE QN: < 0.1 KU/L
SOYBEAN IGE QN: < 0.1 KU/L
STRONGYLOIDES IGG SER IA-ACNC: NORMAL
TIMOTHY IGE QN: < 0.1 KU/L
TOMATO IGE QN: < 0.1 KU/L
TUNA IGE QN: < 0.1 KU/L
WHEAT IGE QN: < 0.1 KU/L
WHITE ASH IGE QN: < 0.1 KU/L
WHITE BEAN IGE QN: < 0.1 KU/L
WHITE ELM IGE QN: < 0.1 KU/L
WHITE MULBERRY IGE QN: < 0.1 KU/L
WHITE OAK IGE QN: < 0.1 KU/L

## 2023-05-16 LAB
HISTAMINE BLD-SCNC: NORMAL NMOL/L
PROTEIN ELECTROPHORESIS, SERUM: NORMAL

## 2023-05-17 LAB
ANCA AB PATTERN SER IF-IMP: NORMAL
ANCA IGG TITR SER IF: NORMAL {TITER}
MISC REFERENCE: NORMAL

## 2023-05-21 LAB — CH50 SERPL-ACNC: 49.6 U/ML (ref 38.7–89.9)

## 2023-05-22 ENCOUNTER — TELEPHONE (OUTPATIENT)
Dept: ALLERGY | Age: 38
End: 2023-05-22

## 2023-05-22 NOTE — TELEPHONE ENCOUNTER
----- Message from JACKLYN Chou CNP sent at 5/22/2023  7:52 AM EDT -----  Please notify patient that so far all her labs have come back acceptable and normal.  We will discuss the remainder of her labs at her next visit as I expect these likely may be normal to.   However if I get anything that is abnormal that I am concern  ed about someone will contact her as stated during the office visit

## 2023-05-22 NOTE — TELEPHONE ENCOUNTER
Called pt and informed her that labwork that is back and resulted that provider reviewed is normal. Informed provider will discuss more at next visit and if anything does come back abnormal that maybe not results, we will give her a call. Pt verbalized understanding and thanked me.

## 2023-07-17 ENCOUNTER — HOSPITAL ENCOUNTER (EMERGENCY)
Age: 38
Discharge: HOME | End: 2023-07-17
Payer: MEDICAID

## 2023-07-17 VITALS
RESPIRATION RATE: 18 BRPM | HEART RATE: 74 BPM | TEMPERATURE: 97.7 F | OXYGEN SATURATION: 100 % | SYSTOLIC BLOOD PRESSURE: 131 MMHG | DIASTOLIC BLOOD PRESSURE: 79 MMHG

## 2023-07-17 DIAGNOSIS — R22.0 RIGHT FACIAL SWELLING: Primary | ICD-10-CM

## 2023-07-17 DIAGNOSIS — K02.9 INFECTED DENTAL CARIES: ICD-10-CM

## 2023-07-17 DIAGNOSIS — K04.7 INFECTED DENTAL CARIES: ICD-10-CM

## 2023-07-17 PROCEDURE — 6360000002 HC RX W HCPCS: Performed by: PHYSICIAN ASSISTANT

## 2023-07-17 PROCEDURE — 96365 THER/PROPH/DIAG IV INF INIT: CPT

## 2023-07-17 PROCEDURE — 96375 TX/PRO/DX INJ NEW DRUG ADDON: CPT

## 2023-07-17 PROCEDURE — 99284 EMERGENCY DEPT VISIT MOD MDM: CPT

## 2023-07-17 PROCEDURE — 2580000003 HC RX 258: Performed by: PHYSICIAN ASSISTANT

## 2023-07-17 RX ORDER — KETOROLAC TROMETHAMINE 30 MG/ML
30 INJECTION, SOLUTION INTRAMUSCULAR; INTRAVENOUS ONCE
Status: COMPLETED | OUTPATIENT
Start: 2023-07-17 | End: 2023-07-17

## 2023-07-17 RX ORDER — AMOXICILLIN AND CLAVULANATE POTASSIUM 875; 125 MG/1; MG/1
1 TABLET, FILM COATED ORAL 2 TIMES DAILY
Qty: 20 TABLET | Refills: 0 | Status: SHIPPED | OUTPATIENT
Start: 2023-07-17 | End: 2023-07-27

## 2023-07-17 RX ORDER — IBUPROFEN 800 MG/1
800 TABLET ORAL EVERY 8 HOURS PRN
Qty: 15 TABLET | Refills: 0 | Status: SHIPPED | OUTPATIENT
Start: 2023-07-17

## 2023-07-17 RX ADMIN — SODIUM CHLORIDE 3000 MG: 900 INJECTION INTRAVENOUS at 08:41

## 2023-07-17 RX ADMIN — KETOROLAC TROMETHAMINE 30 MG: 30 INJECTION, SOLUTION INTRAMUSCULAR; INTRAVENOUS at 08:36

## 2023-07-17 ASSESSMENT — PAIN SCALES - GENERAL: PAINLEVEL_OUTOF10: 6

## 2023-07-17 ASSESSMENT — ENCOUNTER SYMPTOMS
TROUBLE SWALLOWING: 0
FACIAL SWELLING: 1
RHINORRHEA: 0
COUGH: 0
VOMITING: 0
ABDOMINAL PAIN: 0
SORE THROAT: 0
SHORTNESS OF BREATH: 0
NAUSEA: 0

## 2023-07-17 ASSESSMENT — PAIN DESCRIPTION - DESCRIPTORS: DESCRIPTORS: ACHING

## 2023-07-17 ASSESSMENT — PAIN DESCRIPTION - PAIN TYPE: TYPE: ACUTE PAIN

## 2023-07-17 ASSESSMENT — PAIN DESCRIPTION - LOCATION: LOCATION: FACE

## 2023-07-17 ASSESSMENT — PAIN - FUNCTIONAL ASSESSMENT: PAIN_FUNCTIONAL_ASSESSMENT: 0-10

## 2023-07-17 ASSESSMENT — PAIN DESCRIPTION - ORIENTATION: ORIENTATION: RIGHT

## 2023-07-17 NOTE — ED PROVIDER NOTES
normal jaw occlusion. No trismus. Right Ear: Tympanic membrane, ear canal and external ear normal.      Left Ear: Tympanic membrane, ear canal and external ear normal.      Nose: Nose normal. No rhinorrhea. Mouth/Throat:      Lips: Pink. Mouth: Mucous membranes are moist.      Dentition: Dental tenderness and dental caries present. No dental abscesses. Pharynx: Oropharynx is clear. Uvula midline. No uvula swelling. Eyes:      General: Lids are normal.      Conjunctiva/sclera: Conjunctivae normal.      Pupils: Pupils are equal, round, and reactive to light. Neck:      Thyroid: No thyroid mass. Trachea: Trachea normal. No tracheal deviation. Cardiovascular:      Rate and Rhythm: Normal rate and regular rhythm. Heart sounds: Normal heart sounds. Pulmonary:      Effort: Pulmonary effort is normal. No respiratory distress. Breath sounds: Normal breath sounds. No decreased breath sounds or wheezing. Abdominal:      General: There is no distension. Palpations: Abdomen is soft. Abdomen is not rigid. Tenderness: There is no abdominal tenderness. Musculoskeletal:         General: Normal range of motion. Cervical back: Normal range of motion and neck supple. No edema or rigidity. Lymphadenopathy:      Head:      Right side of head: No submental, submandibular or preauricular adenopathy. Left side of head: No submental, submandibular or preauricular adenopathy. Cervical: No cervical adenopathy. Skin:     General: Skin is warm and dry. Coloration: Skin is not pale. Findings: No rash. Neurological:      Mental Status: She is alert and oriented to person, place, and time. GCS: GCS eye subscore is 4. GCS verbal subscore is 5. GCS motor subscore is 6. Gait: Gait normal.   Psychiatric:         Speech: Speech normal.         Behavior: Behavior normal. Behavior is cooperative. Thought Content:  Thought content normal.

## 2024-09-23 ENCOUNTER — HOSPITAL ENCOUNTER (EMERGENCY)
Age: 39
Discharge: HOME | End: 2024-09-23
Attending: EMERGENCY MEDICINE
Payer: COMMERCIAL

## 2024-09-23 ENCOUNTER — APPOINTMENT (OUTPATIENT)
Dept: CT IMAGING | Age: 39
End: 2024-09-23
Payer: COMMERCIAL

## 2024-09-23 VITALS
RESPIRATION RATE: 16 BRPM | SYSTOLIC BLOOD PRESSURE: 99 MMHG | BODY MASS INDEX: 21.95 KG/M2 | DIASTOLIC BLOOD PRESSURE: 65 MMHG | WEIGHT: 120 LBS | HEART RATE: 90 BPM | OXYGEN SATURATION: 100 % | TEMPERATURE: 98.1 F

## 2024-09-23 DIAGNOSIS — K29.00 ACUTE GASTRITIS WITHOUT HEMORRHAGE, UNSPECIFIED GASTRITIS TYPE: Primary | ICD-10-CM

## 2024-09-23 LAB
ALBUMIN SERPL BCG-MCNC: 4.3 G/DL (ref 3.5–5.1)
ALP SERPL-CCNC: 41 U/L (ref 38–126)
ALT SERPL W/O P-5'-P-CCNC: 18 U/L (ref 11–66)
ANION GAP SERPL CALC-SCNC: 9 MEQ/L (ref 8–16)
AST SERPL-CCNC: 20 U/L (ref 5–40)
B-HCG SERPL QL: NEGATIVE
BACTERIA: ABNORMAL
BASOPHILS ABSOLUTE: 0.1 THOU/MM3 (ref 0–0.1)
BASOPHILS NFR BLD AUTO: 0.6 %
BILIRUB CONJ SERPL-MCNC: 0.2 MG/DL (ref 0.1–13.8)
BILIRUB SERPL-MCNC: 0.4 MG/DL (ref 0.3–1.2)
BILIRUB UR QL STRIP: NEGATIVE
BUN SERPL-MCNC: 11 MG/DL (ref 7–22)
CALCIUM SERPL-MCNC: 8.9 MG/DL (ref 8.5–10.5)
CASTS #/AREA URNS LPF: ABNORMAL /LPF
CASTS #/AREA URNS LPF: ABNORMAL /LPF
CHARACTER UR: CLEAR
CHARCOAL URNS QL MICRO: ABNORMAL
CHLORIDE SERPL-SCNC: 106 MEQ/L (ref 98–111)
CO2 SERPL-SCNC: 25 MEQ/L (ref 23–33)
COLOR UR: YELLOW
CREAT SERPL-MCNC: 0.5 MG/DL (ref 0.4–1.2)
CRYSTALS URNS QL MICRO: ABNORMAL
DEPRECATED RDW RBC AUTO: 43.5 FL (ref 35–45)
EOSINOPHIL NFR BLD AUTO: 2.7 %
EOSINOPHILS ABSOLUTE: 0.2 THOU/MM3 (ref 0–0.4)
EPITHELIAL CELLS, UA: ABNORMAL /HPF
ERYTHROCYTE [DISTWIDTH] IN BLOOD BY AUTOMATED COUNT: 11.9 % (ref 11.5–14.5)
GFR SERPL CREATININE-BSD FRML MDRD: > 90 ML/MIN/1.73M2
GLUCOSE SERPL-MCNC: 95 MG/DL (ref 70–108)
GLUCOSE UR QL STRIP.AUTO: NEGATIVE MG/DL
HCT VFR BLD AUTO: 40.4 % (ref 37–47)
HGB BLD-MCNC: 13.5 GM/DL (ref 12–16)
HGB UR QL STRIP.AUTO: NEGATIVE
IMM GRANULOCYTES # BLD AUTO: 0.02 THOU/MM3 (ref 0–0.07)
IMM GRANULOCYTES NFR BLD AUTO: 0.2 %
KETONES UR QL STRIP.AUTO: ABNORMAL
LEUKOCYTE ESTERASE UR QL STRIP.AUTO: NEGATIVE
LIPASE SERPL-CCNC: 17.2 U/L (ref 5.6–51.3)
LYMPHOCYTES ABSOLUTE: 3.6 THOU/MM3 (ref 1–4.8)
LYMPHOCYTES NFR BLD AUTO: 42.8 %
MCH RBC QN AUTO: 33.5 PG (ref 26–33)
MCHC RBC AUTO-ENTMCNC: 33.4 GM/DL (ref 32.2–35.5)
MCV RBC AUTO: 100.2 FL (ref 81–99)
MONOCYTES ABSOLUTE: 0.5 THOU/MM3 (ref 0.4–1.3)
MONOCYTES NFR BLD AUTO: 5.7 %
NEUTROPHILS ABSOLUTE: 4 THOU/MM3 (ref 1.8–7.7)
NEUTROPHILS NFR BLD AUTO: 48 %
NITRITE UR QL STRIP.AUTO: NEGATIVE
NRBC BLD AUTO-RTO: 0 /100 WBC
OSMOLALITY SERPL CALC.SUM OF ELEC: 278.6 MOSMOL/KG (ref 275–300)
PH UR STRIP.AUTO: 6 [PH] (ref 5–9)
PLATELET # BLD AUTO: 248 THOU/MM3 (ref 130–400)
PMV BLD AUTO: 10.3 FL (ref 9.4–12.4)
POTASSIUM SERPL-SCNC: 3.4 MEQ/L (ref 3.5–5.2)
PROT SERPL-MCNC: 6.4 G/DL (ref 6.1–8)
PROT UR STRIP.AUTO-MCNC: NEGATIVE MG/DL
RBC # BLD AUTO: 4.03 MILL/MM3 (ref 4.2–5.4)
RBC #/AREA URNS HPF: ABNORMAL /HPF
RENAL EPI CELLS #/AREA URNS HPF: ABNORMAL /[HPF]
SODIUM SERPL-SCNC: 140 MEQ/L (ref 135–145)
SPECIFIC GRAVITY UA: 1.03 (ref 1–1.03)
UROBILINOGEN, URINE: 1 EU/DL (ref 0–1)
WBC # BLD AUTO: 8.4 THOU/MM3 (ref 4.8–10.8)
WBC #/AREA URNS HPF: ABNORMAL /HPF
YEAST LIKE FUNGI URNS QL MICRO: ABNORMAL

## 2024-09-23 PROCEDURE — 83690 ASSAY OF LIPASE: CPT

## 2024-09-23 PROCEDURE — 36415 COLL VENOUS BLD VENIPUNCTURE: CPT

## 2024-09-23 PROCEDURE — 99285 EMERGENCY DEPT VISIT HI MDM: CPT

## 2024-09-23 PROCEDURE — 82248 BILIRUBIN DIRECT: CPT

## 2024-09-23 PROCEDURE — 6370000000 HC RX 637 (ALT 250 FOR IP): Performed by: EMERGENCY MEDICINE

## 2024-09-23 PROCEDURE — 6360000004 HC RX CONTRAST MEDICATION: Performed by: EMERGENCY MEDICINE

## 2024-09-23 PROCEDURE — 96374 THER/PROPH/DIAG INJ IV PUSH: CPT

## 2024-09-23 PROCEDURE — 85025 COMPLETE CBC W/AUTO DIFF WBC: CPT

## 2024-09-23 PROCEDURE — 74177 CT ABD & PELVIS W/CONTRAST: CPT

## 2024-09-23 PROCEDURE — 81001 URINALYSIS AUTO W/SCOPE: CPT

## 2024-09-23 PROCEDURE — 84703 CHORIONIC GONADOTROPIN ASSAY: CPT

## 2024-09-23 PROCEDURE — 2580000003 HC RX 258: Performed by: EMERGENCY MEDICINE

## 2024-09-23 PROCEDURE — 80053 COMPREHEN METABOLIC PANEL: CPT

## 2024-09-23 PROCEDURE — 2500000003 HC RX 250 WO HCPCS: Performed by: EMERGENCY MEDICINE

## 2024-09-23 RX ORDER — IOPAMIDOL 755 MG/ML
80 INJECTION, SOLUTION INTRAVASCULAR
Status: COMPLETED | OUTPATIENT
Start: 2024-09-23 | End: 2024-09-23

## 2024-09-23 RX ADMIN — IOPAMIDOL 80 ML: 755 INJECTION, SOLUTION INTRAVENOUS at 22:51

## 2024-09-23 RX ADMIN — LIDOCAINE HYDROCHLORIDE: 20 SOLUTION ORAL at 22:22

## 2024-09-23 RX ADMIN — FAMOTIDINE 20 MG: 10 INJECTION INTRAVENOUS at 22:24

## 2024-09-23 ASSESSMENT — PAIN DESCRIPTION - DESCRIPTORS: DESCRIPTORS: PRESSURE

## 2024-09-23 ASSESSMENT — PAIN DESCRIPTION - PAIN TYPE: TYPE: CHRONIC PAIN

## 2024-09-23 ASSESSMENT — PAIN SCALES - GENERAL: PAINLEVEL_OUTOF10: 6

## 2024-09-23 ASSESSMENT — PAIN DESCRIPTION - LOCATION: LOCATION: ABDOMEN

## 2024-09-30 ENCOUNTER — OFFICE VISIT (OUTPATIENT)
Dept: INTERNAL MEDICINE CLINIC | Age: 39
End: 2024-09-30
Payer: COMMERCIAL

## 2024-09-30 VITALS
WEIGHT: 122.2 LBS | HEART RATE: 104 BPM | HEIGHT: 62 IN | TEMPERATURE: 97.6 F | DIASTOLIC BLOOD PRESSURE: 62 MMHG | SYSTOLIC BLOOD PRESSURE: 106 MMHG | BODY MASS INDEX: 22.49 KG/M2 | OXYGEN SATURATION: 97 %

## 2024-09-30 DIAGNOSIS — K29.00 ACUTE GASTRITIS WITHOUT HEMORRHAGE, UNSPECIFIED GASTRITIS TYPE: Primary | ICD-10-CM

## 2024-09-30 PROCEDURE — 99203 OFFICE O/P NEW LOW 30 MIN: CPT

## 2024-09-30 PROCEDURE — G8427 DOCREV CUR MEDS BY ELIG CLIN: HCPCS

## 2024-09-30 PROCEDURE — 4004F PT TOBACCO SCREEN RCVD TLK: CPT

## 2024-09-30 PROCEDURE — G8420 CALC BMI NORM PARAMETERS: HCPCS

## 2024-09-30 RX ORDER — PANTOPRAZOLE SODIUM 40 MG/1
40 TABLET, DELAYED RELEASE ORAL
Qty: 45 TABLET | Refills: 0 | Status: SHIPPED | OUTPATIENT
Start: 2024-09-30

## 2024-09-30 SDOH — ECONOMIC STABILITY: INCOME INSECURITY: HOW HARD IS IT FOR YOU TO PAY FOR THE VERY BASICS LIKE FOOD, HOUSING, MEDICAL CARE, AND HEATING?: NOT VERY HARD

## 2024-09-30 SDOH — ECONOMIC STABILITY: FOOD INSECURITY: WITHIN THE PAST 12 MONTHS, THE FOOD YOU BOUGHT JUST DIDN'T LAST AND YOU DIDN'T HAVE MONEY TO GET MORE.: NEVER TRUE

## 2024-09-30 SDOH — ECONOMIC STABILITY: FOOD INSECURITY: WITHIN THE PAST 12 MONTHS, YOU WORRIED THAT YOUR FOOD WOULD RUN OUT BEFORE YOU GOT MONEY TO BUY MORE.: NEVER TRUE

## 2024-09-30 ASSESSMENT — PATIENT HEALTH QUESTIONNAIRE - PHQ9
2. FEELING DOWN, DEPRESSED OR HOPELESS: NOT AT ALL
5. POOR APPETITE OR OVEREATING: NOT AT ALL
6. FEELING BAD ABOUT YOURSELF - OR THAT YOU ARE A FAILURE OR HAVE LET YOURSELF OR YOUR FAMILY DOWN: SEVERAL DAYS
4. FEELING TIRED OR HAVING LITTLE ENERGY: NEARLY EVERY DAY
10. IF YOU CHECKED OFF ANY PROBLEMS, HOW DIFFICULT HAVE THESE PROBLEMS MADE IT FOR YOU TO DO YOUR WORK, TAKE CARE OF THINGS AT HOME, OR GET ALONG WITH OTHER PEOPLE: NOT DIFFICULT AT ALL
8. MOVING OR SPEAKING SO SLOWLY THAT OTHER PEOPLE COULD HAVE NOTICED. OR THE OPPOSITE, BEING SO FIGETY OR RESTLESS THAT YOU HAVE BEEN MOVING AROUND A LOT MORE THAN USUAL: NOT AT ALL
1. LITTLE INTEREST OR PLEASURE IN DOING THINGS: NOT AT ALL
SUM OF ALL RESPONSES TO PHQ QUESTIONS 1-9: 4
7. TROUBLE CONCENTRATING ON THINGS, SUCH AS READING THE NEWSPAPER OR WATCHING TELEVISION: NOT AT ALL
SUM OF ALL RESPONSES TO PHQ QUESTIONS 1-9: 4
SUM OF ALL RESPONSES TO PHQ QUESTIONS 1-9: 4
3. TROUBLE FALLING OR STAYING ASLEEP: NOT AT ALL
SUM OF ALL RESPONSES TO PHQ9 QUESTIONS 1 & 2: 0
SUM OF ALL RESPONSES TO PHQ QUESTIONS 1-9: 4
9. THOUGHTS THAT YOU WOULD BE BETTER OFF DEAD, OR OF HURTING YOURSELF: NOT AT ALL

## 2024-09-30 NOTE — PROGRESS NOTES
children: 4    Years of education: Not on file    Highest education level: Not on file   Occupational History    Not on file   Tobacco Use    Smoking status: Every Day     Current packs/day: 0.50     Types: Cigarettes, E-Cigarettes     Passive exposure: Current    Smokeless tobacco: Never   Vaping Use    Vaping status: Some Days    Substances: Nicotine, THC, Flavoring    Devices: Disposable, Pre-filled or refillable cartridge   Substance and Sexual Activity    Alcohol use: Yes     Alcohol/week: 5.0 standard drinks of alcohol     Types: 5 Cans of beer per week     Comment: daily/every other day--has nothad any in 1 week    Drug use: Yes     Types: Marijuana (Weed)    Sexual activity: Yes     Partners: Male   Other Topics Concern    Not on file   Social History Narrative    Not on file     Social Determinants of Health     Financial Resource Strain: Low Risk  (9/30/2024)    Overall Financial Resource Strain (CARDIA)     Difficulty of Paying Living Expenses: Not very hard   Food Insecurity: No Food Insecurity (9/30/2024)    Hunger Vital Sign     Worried About Running Out of Food in the Last Year: Never true     Ran Out of Food in the Last Year: Never true   Transportation Needs: Unknown (9/30/2024)    PRAPARE - Transportation     Lack of Transportation (Medical): Not on file     Lack of Transportation (Non-Medical): No   Physical Activity: Not on file   Stress: Not on file   Social Connections: Not on file   Intimate Partner Violence: Not on file   Housing Stability: Unknown (9/30/2024)    Housing Stability Vital Sign     Unable to Pay for Housing in the Last Year: Not on file     Number of Times Moved in the Last Year: Not on file     Homeless in the Last Year: No       Family History   Problem Relation Age of Onset    Other Mother         copd    Asthma Mother     Heart Disease Mother     High Blood Pressure Mother     Asthma Father     Heart Disease Father     High Blood Pressure Father     Stroke Father

## 2024-11-05 ENCOUNTER — OFFICE VISIT (OUTPATIENT)
Dept: INTERNAL MEDICINE CLINIC | Age: 39
End: 2024-11-05
Payer: COMMERCIAL

## 2024-11-05 VITALS
DIASTOLIC BLOOD PRESSURE: 60 MMHG | HEART RATE: 98 BPM | WEIGHT: 121 LBS | HEIGHT: 62 IN | OXYGEN SATURATION: 99 % | SYSTOLIC BLOOD PRESSURE: 104 MMHG | BODY MASS INDEX: 22.26 KG/M2

## 2024-11-05 DIAGNOSIS — K29.00 ACUTE GASTRITIS WITHOUT HEMORRHAGE, UNSPECIFIED GASTRITIS TYPE: Primary | ICD-10-CM

## 2024-11-05 PROCEDURE — G8484 FLU IMMUNIZE NO ADMIN: HCPCS

## 2024-11-05 PROCEDURE — G8427 DOCREV CUR MEDS BY ELIG CLIN: HCPCS

## 2024-11-05 PROCEDURE — 4004F PT TOBACCO SCREEN RCVD TLK: CPT

## 2024-11-05 PROCEDURE — G8420 CALC BMI NORM PARAMETERS: HCPCS

## 2024-11-05 PROCEDURE — 99212 OFFICE O/P EST SF 10 MIN: CPT

## 2024-11-05 NOTE — PROGRESS NOTES
1985    Chief Complaint   Patient presents with    gastritis       Patient presents for 6 weeks follow-up for acute gastritis.  She stated since her previous visit she has eliminated beer, soda, dairy and has switched to sugar-free drinks.  She states there is significant improvement today with the addition of a PPI however all of her symptoms have not gone away, she still has episodes of this abdominal pain.      Past Medical History:   Diagnosis Date    Complication of anesthesia     headache after last spinal    Postpartum depression     history of depression       Past Surgical History:   Procedure Laterality Date    ARM SURGERY Left     x 2-plates     SECTION  , , , ,2021    x5     SECTION Bilateral 06/15/2021    REPEAT  SECTION WITH BILATBERAL SALPINECTOMY performed by Danielle Landaverde MD at [facility] L&D OR    CHOANAL ADENIODECTOMY      TONSILLECTOMY      WRIST SURGERY  age 12       Current Outpatient Medications   Medication Sig Dispense Refill    pantoprazole (PROTONIX) 40 MG tablet Take 1 tablet by mouth every morning (before breakfast) 45 tablet 0    EPINEPHrine (EPIPEN 2-DARRIAN) 0.3 MG/0.3ML SOAJ injection Inject 0.3 mLs into the skin once for 1 dose Use as directed for allergic reaction 0.3 mL 0    acetaminophen (TYLENOL) 500 MG tablet Take 2 tablets by mouth every 6 hours as needed for Pain       No current facility-administered medications for this visit.       Allergies   Allergen Reactions    Erythromycin Nausea And Vomiting       Social History     Socioeconomic History    Marital status: Legally      Spouse name: Not on file    Number of children: 4    Years of education: Not on file    Highest education level: Not on file   Occupational History    Not on file   Tobacco Use    Smoking status: Every Day     Current packs/day: 0.50     Types: Cigarettes, E-Cigarettes     Passive exposure: Current    Smokeless tobacco: Never   Vaping Use    Vaping

## 2024-11-14 ENCOUNTER — HOSPITAL ENCOUNTER (EMERGENCY)
Age: 39
Discharge: HOME | End: 2024-11-14
Payer: COMMERCIAL

## 2024-11-14 VITALS
DIASTOLIC BLOOD PRESSURE: 84 MMHG | SYSTOLIC BLOOD PRESSURE: 133 MMHG | RESPIRATION RATE: 16 BRPM | HEART RATE: 98 BPM | TEMPERATURE: 99.2 F | WEIGHT: 122.8 LBS | OXYGEN SATURATION: 98 % | BODY MASS INDEX: 22.45 KG/M2

## 2024-11-14 DIAGNOSIS — J06.9 UPPER RESPIRATORY TRACT INFECTION, UNSPECIFIED TYPE: Primary | ICD-10-CM

## 2024-11-14 PROCEDURE — 99213 OFFICE O/P EST LOW 20 MIN: CPT

## 2024-11-14 PROCEDURE — 99214 OFFICE O/P EST MOD 30 MIN: CPT

## 2024-11-14 RX ORDER — AZITHROMYCIN 250 MG/1
TABLET, FILM COATED ORAL
Qty: 6 TABLET | Refills: 0 | Status: SHIPPED | OUTPATIENT
Start: 2024-11-14 | End: 2024-11-24

## 2024-11-14 ASSESSMENT — ENCOUNTER SYMPTOMS
CONSTIPATION: 0
SINUS PAIN: 0
COLOR CHANGE: 0
WHEEZING: 0
ABDOMINAL PAIN: 0
SHORTNESS OF BREATH: 0
EYE REDNESS: 0
SORE THROAT: 0
EYE ITCHING: 1
PHOTOPHOBIA: 0
TROUBLE SWALLOWING: 0
RHINORRHEA: 0
NAUSEA: 0
EYE PAIN: 0
SINUS PRESSURE: 0
APNEA: 0
CHEST TIGHTNESS: 0
DIARRHEA: 0
VOMITING: 0
COUGH: 1

## 2024-11-14 ASSESSMENT — PAIN SCALES - GENERAL: PAINLEVEL_OUTOF10: 6

## 2024-11-14 ASSESSMENT — PAIN - FUNCTIONAL ASSESSMENT: PAIN_FUNCTIONAL_ASSESSMENT: PREVENTS OR INTERFERES SOME ACTIVE ACTIVITIES AND ADLS

## 2024-11-14 ASSESSMENT — PAIN DESCRIPTION - LOCATION: LOCATION: GENERALIZED

## 2024-11-14 ASSESSMENT — PAIN DESCRIPTION - FREQUENCY: FREQUENCY: INTERMITTENT

## 2024-11-14 NOTE — DISCHARGE INSTRUCTIONS
Zpack was prescribed.     I recommend you start using a daily antihistamine, such as Zyrtec.      You can use chlorpheniramine maleate as needed for cough at night and postnasal drip.      You can use Mucinex, I recommended without the DM.  Ensure that they are drinking extra water with this, take separately with a full glass of water.  You can use Matlock pot or nasal rinse for symptom relief of sinus pressure.

## 2024-11-14 NOTE — ED PROVIDER NOTES
PROCEDURES:  None    FINAL IMPRESSION      1. Upper respiratory tract infection, unspecified type          DISPOSITION/ PLAN     Alyssa is a 39-year-old female patient to [facility] urgent [facility] for evaluation of URI.  Upon assessment, patient resting comfortably on cot with easy respirations.  No acute/obvious distress observed at this time.  I agree with physical exam documented above, it is unremarkable, except for listed above.  Discussed with patient resources at urgent care and plans of care options.  Discussed physical exam findings with patient.  Due to length of illness and intermittent fevers, a Z-Mauricio was prescribed for her.  Patient reports that she has tolerated this in the past.  Discussed using antihistamines to help dry up excess congestion and reducing the likelihood of developing further bacterial infections at this time.  Advised patient to start using a daily antihistamine, such as Zyrtec.  Advised patient that they can use chlorpheniramine maleate as needed for cough at night and postnasal drip.  Advised patient that if they would like to use Mucinex that they can, recommended without the DM.  Advised patient that they need to ensure that they are drinking extra water with this.  Educated to take medication separately with a full glass of water.  Advised patient that they can use Rosina pot or nasal rinse for symptom relief of sinus pressure.  Advised patient to monitor for worsening signs and symptoms, such as shortness of breath or chest tightness that is ongoing.  Pt actively participated in plan of care.  Medication education provided.  Discussed follow-up with PCP.  Questions and concerns answered at this time.      PATIENT REFERRED TO:  No primary care provider on file.  No primary physician on file.      DISCHARGE MEDICATIONS:  Discharge Medication List as of 11/14/2024  6:20 PM        START taking these medications    Details   azithromycin (ZITHROMAX) 250 MG tablet 500mg on day 1  followed by 250mg on days 2 - 5, Disp-6 tablet, R-0Normal             Discharge Medication List as of 11/14/2024  6:20 PM          Discharge Medication List as of 11/14/2024  6:20 PM          JACKLYN Carlisle CNP    (Please note that portions of this note were completed with a voice recognition program. Efforts were made to edit the dictations but occasionally words are mis-transcribed.)            Candi Katz APRN - CNP  11/14/24 1847

## 2024-11-14 NOTE — DISCHARGE INSTR - COC
Continuity of Care Form    Patient Name: Alyssa De Leon   :  1985  MRN:  [id number]    Admit date:  2024  Discharge date:  ***    Code Status Order: Prior   Advance Directives:   Advance Care Flowsheet Documentation             Admitting Physician:  No admitting provider for patient encounter.  PCP: No primary care provider on file.    Discharging Nurse: ***  Discharging Hospital Unit/Room#:   Discharging Unit Phone Number: ***    Emergency Contact:   Extended Emergency Contact Information  Primary Emergency Contact: Tammie Hercules   [facility]  Home Phone: [phone]  Relation: Parent    Past Surgical History:  Past Surgical History:   Procedure Laterality Date    ARM SURGERY Left     x 2-plates     SECTION  , , , ,2021    x5     SECTION Bilateral 06/15/2021    REPEAT  SECTION WITH BILATBERAL SALPINECTOMY performed by Danielle L[facility]verde MD at [facility] L[facility]D [facility]    CHOANAL ADENIODECTOMY      TONSILLECTOMY      WRIST SURGERY  age 12       Immunization History:   Immunization History   Administered Date(s) Administered    DTP 1985, [birth year], 10/12/1988, 1992    Hep B, ENGERIX-B, RECOMBIVAX-HB, (age Birth - 19y), IM, 0.5mL 2001, 10/24/2001, 2002    Influenza, FLUARIX, FLULAVAL, FLUZONE (age 6 mo+) and AFLURIA, (age 3 y+), Quadv PF, 0.5mL 2019    MMR, PRIORIX, M-M-R II, (age 12m+), SC, 0.5mL 1988, 2001    Polio OPV 1987, 1987, 10/12/1988, 1992    TDaP, ADACEL (age 10y-64y), BOOSTRIX (age 10y+), IM, 0.5mL 2021, 2021       Active Problems:  Patient Active Problem List   Diagnosis Code    S/P  Z98.891    Blister T14.8XXA    IUGR (intrauterine growth retardation) [id number]    Major depressive disorder, recurrent severe without psychotic features (HCC) F33.2    Single delivery by  O82       Isolation/Infection:   Isolation            No Isolation           Patient Infection Status       Infection Onset Added Last Indicated Last Indicated By Review Planned Expiration Resolved Resolved By    None active    Resolved    COVID-19 21 COVID-19, Rapid   21 Infection                        Nurse Assessment:  Last Vital Signs: /84   Pulse 98   Temp 99.2 °F (37.3 °C) (Oral)   Resp 16   Wt 55.7 kg (122 lb 12.8 oz)   LMP 2024 (Approximate)   SpO2 98%   BMI 22.45 kg/m²     Last documented pain score (0-10 scale): Pain Level: 6  Last Weight:   Wt Readings from Last 1 Encounters:   24 55.7 kg (122 lb 12.8 oz)     Mental Status:  {IP PT MENTAL STATUS:}    IV Access:  { LINDSEY IV ACCESS:190743168}    Nursing Mobility/ADLs:  Walking   {CHP DME ADLs:102252058}  Transfer  {CHP DME ADLs:083481282}  Bathing  {CHP DME ADLs:136922102}  Dressing  {CHP DME ADLs:108028756}  Toileting  {CHP DME ADLs:539386154}  Feeding  {CHP DME ADLs:542113154}  Med Admin  {P DME ADLs:741280174}  Med Delivery   { LINDSEY MED Delivery:390200008}    Wound Care Documentation and Therapy:        Elimination:  Continence:   Bowel: {YES / NO:}  Bladder: {YES / NO:}  Urinary Catheter: {Urinary Catheter:316767596}   Colostomy/Ileostomy/Ileal Conduit: {YES / NO:}       Date of Last BM: ***  No intake or output data in the 24 hours ending 24 1843  No intake/output data recorded.    Safety Concerns:     { LINDSEY Safety Concerns:207818197}    Impairments/Disabilities:      { LINDSEY Impairments/Disabilities:026083829}    Nutrition Therapy:  Current Nutrition Therapy:   { LINDSEY Diet List:760105028}    Routes of Feeding: {CHP DME Other Feedings:936274699}  Liquids: {Slp liquid thickness:38055}  Daily Fluid Restriction: {CHP DME Yes amt example:922084078}  Last Modified Barium Swallow with Video (Video Swallowing Test): {Done Not Done Date:601239081}    Treatments at the Time of Hospital Discharge:   Respiratory Treatments: ***  Oxygen

## 2024-11-14 NOTE — ED TRIAGE NOTES
Alyssa arrives to room with complaint of  cough, sinus congestion, sneezing, felt warm, night sweats  symptoms started Saturday    Taking nyquil, vit c, zinc, cough drops

## (undated) DEVICE — SUTURE ABSORBABLE MONOFILAMENT 0 CTX 60 IN VIO PDS + PDP990G

## (undated) DEVICE — APPLICATOR MEDICATED 26 CC SOLUTION HI LT ORNG CHLORAPREP

## (undated) DEVICE — SUTURE VCRL SZ 1 L36IN ABSRB UD CTX L48MM 1/2 CIR J977H

## (undated) DEVICE — PACK PROCEDURE SURG C SECT SRMC LF

## (undated) DEVICE — SUTURE CHROMIC GUT SZ 0 L36IN ABSRB BRN CTX L48MM 1/2 CIR 904H

## (undated) DEVICE — DISSECTOR ENDOSCP L21CM TIP CURVATURE 40DEG FN CRV JAW VES

## (undated) DEVICE — SUTURE PLN GUT SZ 2-0 L27IN ABSRB YELLOWISH TAN L36MM CT-1 843H